# Patient Record
Sex: FEMALE | Race: WHITE | Employment: OTHER | URBAN - NONMETROPOLITAN AREA
[De-identification: names, ages, dates, MRNs, and addresses within clinical notes are randomized per-mention and may not be internally consistent; named-entity substitution may affect disease eponyms.]

---

## 2017-02-13 ENCOUNTER — OFFICE VISIT (OUTPATIENT)
Dept: CARDIOLOGY | Age: 77
End: 2017-02-13
Payer: MEDICARE

## 2017-02-13 VITALS
BODY MASS INDEX: 30.39 KG/M2 | HEIGHT: 64 IN | DIASTOLIC BLOOD PRESSURE: 66 MMHG | SYSTOLIC BLOOD PRESSURE: 132 MMHG | HEART RATE: 60 BPM | WEIGHT: 178 LBS

## 2017-02-13 DIAGNOSIS — E78.2 MIXED HYPERLIPIDEMIA: ICD-10-CM

## 2017-02-13 DIAGNOSIS — I48.0 PAROXYSMAL ATRIAL FIBRILLATION (HCC): Primary | ICD-10-CM

## 2017-02-13 DIAGNOSIS — I10 ESSENTIAL HYPERTENSION: ICD-10-CM

## 2017-02-13 PROCEDURE — 99213 OFFICE O/P EST LOW 20 MIN: CPT | Performed by: NURSE PRACTITIONER

## 2017-02-13 PROCEDURE — G8400 PT W/DXA NO RESULTS DOC: HCPCS | Performed by: NURSE PRACTITIONER

## 2017-02-13 PROCEDURE — 1090F PRES/ABSN URINE INCON ASSESS: CPT | Performed by: NURSE PRACTITIONER

## 2017-02-13 PROCEDURE — 1123F ACP DISCUSS/DSCN MKR DOCD: CPT | Performed by: NURSE PRACTITIONER

## 2017-02-13 PROCEDURE — 4040F PNEUMOC VAC/ADMIN/RCVD: CPT | Performed by: NURSE PRACTITIONER

## 2017-02-13 PROCEDURE — G8484 FLU IMMUNIZE NO ADMIN: HCPCS | Performed by: NURSE PRACTITIONER

## 2017-02-13 PROCEDURE — G8417 CALC BMI ABV UP PARAM F/U: HCPCS | Performed by: NURSE PRACTITIONER

## 2017-02-13 PROCEDURE — 1036F TOBACCO NON-USER: CPT | Performed by: NURSE PRACTITIONER

## 2017-02-13 PROCEDURE — G8427 DOCREV CUR MEDS BY ELIG CLIN: HCPCS | Performed by: NURSE PRACTITIONER

## 2017-02-13 PROCEDURE — 93000 ELECTROCARDIOGRAM COMPLETE: CPT | Performed by: NURSE PRACTITIONER

## 2017-03-20 DIAGNOSIS — I48.91 ATRIAL FIBRILLATION, UNSPECIFIED TYPE (HCC): ICD-10-CM

## 2017-03-20 RX ORDER — PROPAFENONE HYDROCHLORIDE 225 MG/1
225 TABLET, FILM COATED ORAL EVERY 8 HOURS
Qty: 270 TABLET | Refills: 3 | Status: SHIPPED | OUTPATIENT
Start: 2017-03-20 | End: 2018-03-13 | Stop reason: SDUPTHER

## 2017-09-12 ENCOUNTER — OFFICE VISIT (OUTPATIENT)
Dept: CARDIOLOGY | Age: 77
End: 2017-09-12
Payer: MEDICARE

## 2017-09-12 VITALS
DIASTOLIC BLOOD PRESSURE: 70 MMHG | WEIGHT: 178.8 LBS | SYSTOLIC BLOOD PRESSURE: 118 MMHG | BODY MASS INDEX: 30.52 KG/M2 | HEIGHT: 64 IN | HEART RATE: 74 BPM

## 2017-09-12 DIAGNOSIS — I10 ESSENTIAL HYPERTENSION: ICD-10-CM

## 2017-09-12 DIAGNOSIS — Z79.01 CHRONIC ANTICOAGULATION: ICD-10-CM

## 2017-09-12 DIAGNOSIS — I48.0 PAF (PAROXYSMAL ATRIAL FIBRILLATION) (HCC): Primary | ICD-10-CM

## 2017-09-12 DIAGNOSIS — R53.1 WEAKNESS: ICD-10-CM

## 2017-09-12 PROCEDURE — 99213 OFFICE O/P EST LOW 20 MIN: CPT | Performed by: NURSE PRACTITIONER

## 2017-09-12 PROCEDURE — 1123F ACP DISCUSS/DSCN MKR DOCD: CPT | Performed by: NURSE PRACTITIONER

## 2017-09-12 PROCEDURE — G8417 CALC BMI ABV UP PARAM F/U: HCPCS | Performed by: NURSE PRACTITIONER

## 2017-09-12 PROCEDURE — 4040F PNEUMOC VAC/ADMIN/RCVD: CPT | Performed by: NURSE PRACTITIONER

## 2017-09-12 PROCEDURE — 1036F TOBACCO NON-USER: CPT | Performed by: NURSE PRACTITIONER

## 2017-09-12 PROCEDURE — 1090F PRES/ABSN URINE INCON ASSESS: CPT | Performed by: NURSE PRACTITIONER

## 2017-09-12 PROCEDURE — G8427 DOCREV CUR MEDS BY ELIG CLIN: HCPCS | Performed by: NURSE PRACTITIONER

## 2017-09-12 PROCEDURE — G8400 PT W/DXA NO RESULTS DOC: HCPCS | Performed by: NURSE PRACTITIONER

## 2018-03-13 ENCOUNTER — OFFICE VISIT (OUTPATIENT)
Dept: CARDIOLOGY | Age: 78
End: 2018-03-13
Payer: MEDICARE

## 2018-03-13 VITALS
BODY MASS INDEX: 30.22 KG/M2 | HEIGHT: 64 IN | SYSTOLIC BLOOD PRESSURE: 138 MMHG | WEIGHT: 177 LBS | HEART RATE: 70 BPM | DIASTOLIC BLOOD PRESSURE: 80 MMHG

## 2018-03-13 DIAGNOSIS — Z79.01 CHRONIC ANTICOAGULATION: ICD-10-CM

## 2018-03-13 DIAGNOSIS — I10 ESSENTIAL HYPERTENSION: ICD-10-CM

## 2018-03-13 DIAGNOSIS — R31.9 HEMATURIA, UNSPECIFIED TYPE: ICD-10-CM

## 2018-03-13 DIAGNOSIS — E78.2 MIXED HYPERLIPIDEMIA: ICD-10-CM

## 2018-03-13 DIAGNOSIS — I48.0 PAROXYSMAL ATRIAL FIBRILLATION (HCC): Primary | ICD-10-CM

## 2018-03-13 LAB
BILIRUBIN URINE: NEGATIVE
BLOOD, URINE: NEGATIVE
CLARITY: ABNORMAL
COLOR: YELLOW
GLUCOSE URINE: 100 MG/DL
KETONES, URINE: ABNORMAL MG/DL
LEUKOCYTE ESTERASE, URINE: NEGATIVE
NITRITE, URINE: NEGATIVE
PH UA: 6
PROTEIN UA: NEGATIVE MG/DL
SPECIFIC GRAVITY UA: >=1.03
UROBILINOGEN, URINE: 0.2 E.U./DL

## 2018-03-13 PROCEDURE — G8400 PT W/DXA NO RESULTS DOC: HCPCS | Performed by: NURSE PRACTITIONER

## 2018-03-13 PROCEDURE — 1036F TOBACCO NON-USER: CPT | Performed by: NURSE PRACTITIONER

## 2018-03-13 PROCEDURE — 1123F ACP DISCUSS/DSCN MKR DOCD: CPT | Performed by: NURSE PRACTITIONER

## 2018-03-13 PROCEDURE — G8484 FLU IMMUNIZE NO ADMIN: HCPCS | Performed by: NURSE PRACTITIONER

## 2018-03-13 PROCEDURE — 1090F PRES/ABSN URINE INCON ASSESS: CPT | Performed by: NURSE PRACTITIONER

## 2018-03-13 PROCEDURE — 99213 OFFICE O/P EST LOW 20 MIN: CPT | Performed by: NURSE PRACTITIONER

## 2018-03-13 PROCEDURE — G8417 CALC BMI ABV UP PARAM F/U: HCPCS | Performed by: NURSE PRACTITIONER

## 2018-03-13 PROCEDURE — 4040F PNEUMOC VAC/ADMIN/RCVD: CPT | Performed by: NURSE PRACTITIONER

## 2018-03-13 PROCEDURE — G8427 DOCREV CUR MEDS BY ELIG CLIN: HCPCS | Performed by: NURSE PRACTITIONER

## 2018-03-13 RX ORDER — MULTIVIT WITH MINERALS/LUTEIN
1000 TABLET ORAL DAILY
COMMUNITY
End: 2021-06-15

## 2018-03-13 RX ORDER — ESCITALOPRAM OXALATE 10 MG/1
TABLET ORAL
Refills: 3 | COMMUNITY
Start: 2017-12-11

## 2018-03-13 RX ORDER — PROPAFENONE HYDROCHLORIDE 225 MG/1
225 TABLET, FILM COATED ORAL EVERY 8 HOURS
Qty: 270 TABLET | Refills: 3 | Status: SHIPPED | OUTPATIENT
Start: 2018-03-13 | End: 2019-03-13 | Stop reason: SDUPTHER

## 2018-03-13 NOTE — PROGRESS NOTES
shortness of air. Cardiovascular  no exertional chest pain to suggest myocardial ischemia. No orthopnea or PND. No sensation of sustained arrythmia. No occurrence of slow heart rate. No palpitations. No claudication. No leg edema. Gastrointestinal  no abdominal swelling or pain. No blood in stool. No severe constipation, diarrhea, nausea, or vomiting. Genitourinary  no dysuria, frequency, or urgency. No flank pain. + episode of hematuria. Musculoskeletal  no back pain or myalgia. No problems with gait. Extremities - no clubbing, cyanosis or edema. Skin  no color change or rash. No pallor. No new surgical incision. Neurologic  no speech difficulty, facial asymmetry or lateralizing weakness. No seizures, presyncope or syncope. No significant dizziness. Hematologic  no easy bruising or excessive bleeding. Psychiatric  no severe anxiety or insomnia. No confusion. All other review of systems are negative. Objective  Vital Signs - /80   Pulse 70   Ht 5' 4\" (1.626 m)   Wt 177 lb (80.3 kg)   BMI 30.38 kg/m²   General - [place] is alert, cooperative, and pleasant. Well groomed. No acute distress. Body habitus - Body mass index is 30.38 kg/m². HEENT  Head is normocephalic. No circumoral cyanosis. Dentition is normal.  EYES -   Lids normal without ptosis. No discharge, edema or subconjunctival hemorrhage. Neck - Symmetrical without apparent mass or lymphadenopathy. Respiratory - Normal respiratory effort without use of accessory muscles. Ausculatation reveals vesicular breath sounds without crackles, wheezes, rub or rhonchi. Cardiovascular  No jugular venous distention. Auscultation reveals regular rate and rhythm. No audible clicks, gallop or rub. 1/6 systolic murmur. No lower extremity varicosities. No carotid bruits. Abdominal -  No visible distention, mass or pulsations. Extremities - No clubbing or cyanosis. No statis dermatitis or ulcers. No edema.

## 2018-03-13 NOTE — PATIENT INSTRUCTIONS
Continue current medications as prescribed. Continue to follow up with primary care provider for non cardiac medical problems. Call the office with any problems, questions or concerns at [phone]. Follow up as scheduled with your cardiologist - 6 months Dr. Susan Medina. The following educational material has been included in this after visit summary for your review: atrial fibrillation and Eliquis.     Additional instructions:  Go to first floor outpatient registration for lab. Eliquis can increase your risk of bleeding. If you notice blood in urine or stool, bleeding gums, excessive bruising or cough productive of bloody sputum, notify the office. Information on this blood thinner has been included in your after visit summary. Coronary artery disease risk factors you can control: Smoking, high blood pressure, high cholesterol, diabetes, being overweight, lack of exercise and stress. Continue heart healthy diet. Take medications as directed. Exercise as tolerated. Strive for 15 minutes of exercise most days of the week. If asked to keep a blood pressure log, do so for 2 weeks. Call the office to report readings at [phone]. Blood pressure goal is 140/90 or less. If you are a diabetic, the goal is 130/80 or less. If you are taking cholesterol lowering medications, it is recommended that lab work be checked annually. Always keep a current medication list. Bring your medications to every office visit.          Patient Education        Learning About Atrial Fibrillation  What is atrial fibrillation? Atrial fibrillation (say \"AY-tree-kimberley iap-mnrh-MPB-shun\") is the most common type of irregular heartbeat (arrhythmia). Normally, the heart beats in a strong, steady rhythm. In atrial fibrillation, a problem with the heart's electrical system causes the two upper parts of the heart (the atria) to quiver, or fibrillate.  Your heart rate also may be faster than normal.  Atrial fibrillation can be artificial heart valve, or if you have any active bleeding from a surgery, injury, or other cause. Apixaban can cause a very serious blood clot around your spinal cord if you undergo a spinal tap or receive spinal anesthesia (epidural), especially if you have a genetic spinal defect, if you have a spinal catheter in place, if you have a history of spinal surgery or repeated spinal taps, or if you are also using other drugs that can affect blood clotting. This type of blood clot can lead to long-term or permanent paralysis. Get emergency medical help if you have symptoms of a spinal cord blood clot such as back pain, numbness or muscle weakness in your lower body, or loss of bladder or bowel control. Do not stop taking apixaban unless your doctor tells you to. Stopping suddenly can increase your risk of blood clot or stroke. What is apixaban? Apixaban blocks the activity of certain clotting substances in the blood. Apixaban is used to lower the risk of stroke caused by a blood clot in people with a heart rhythm disorder called atrial fibrillation. Apixaban is also used after hip or knee replacement surgery to prevent a type of blood clot called deep vein thrombosis (DVT), which can lead to blood clots in the lungs (pulmonary embolism). Apixaban is also used to treat DVT or pulmonary embolism (PE), and to lower your risk of having a repeat DVT or PE. Apixaban may also be used for purposes not listed in this medication guide. What should I discuss with my healthcare provider before taking apixaban? You should not take apixaban if you are allergic to it, if you have an artificial heart valve, or if you have any active bleeding from a surgery, injury, or other cause. Apixaban may cause you to bleed more easily, especially if you have a bleeding disorder that is inherited or caused by disease.   To make sure apixaban is safe for you, tell your doctor if you have:  · kidney disease (or if you are on dialysis);  · liver disease;  · if you are older than 80; or  · if you weigh less than 132 pounds. Apixaban can cause a very serious blood clot around your spinal cord if you undergo a spinal tap or receive spinal anesthesia (epidural). This type of blood clot could cause long-term paralysis, and may be more likely to occur if:   · you have a spinal catheter in place or if a catheter has been recently removed;  · you have a history of spinal surgery or repeated spinal taps;  · you have recently had a spinal tap or epidural anesthesia;  · you are taking an NSAID (nonsteroidal anti-inflammatory drug)--ibuprofen (Advil, Motrin), naproxen (Aleve), diclofenac, indomethacin, meloxicam, and others; or  · you are using other medicines to treat or prevent blood clots. This medicine is not expected to harm an unborn baby. However, taking this medicine during pregnancy may increase the risk of bleeding while you are pregnant or during your delivery. Tell your doctor if you are pregnant or plan to become pregnant. It is not known whether apixaban passes into breast milk or if it could harm a nursing baby. You should not breast-feed while using this medicine. How should I take apixaban? Apixaban is usually taken twice per day. Follow all directions on your prescription label. Do not take this medicine in larger or smaller amounts or for longer than recommended. You may take apixaban with or without food. To make swallowing easier, you may crush an apixaban tablet and mix the medicine with water or apple juice, or with a spoonful of applesauce. Swallow the mixture right away without chewing. Do not save it for later use. The mixture will no longer be good after 4 hours. Apixaban can be given through a nasogastric (NG) feeding tube. Crush the tablet and mix the medicine in a syringe with 60 milliliters of 5% dextrose in water (D5W). Give this mixture right away through the NG tube. Do not save for later use.  Do not give paroxetine, sertraline (Zoloft), trazodone, venlafaxine, vilazodone;  · an NSAID such as ibuprofen (Advil, Motrin), naproxen (Aleve), celecoxib (Celebrex), diclofenac, indomethacin, meloxicam, and others; or  · salicylates such as aspirin, Nuprin Backache Caplet, Kaopectate, KneeRelief, Pamprin Cramp Formula, Pepto-Bismol, Tricosal, Trilisate, and others. These lists are not complete and many other medicines can interact with apixaban. This includes prescription and over-the-counter medicines, vitamins, and herbal products. Give a list of all your medicines to any healthcare provider who treats you. Where can I get more information? Your pharmacist can provide more information about apixaban. Remember, keep this and all other medicines out of the reach of children, never share your medicines with others, and use this medication only for the indication prescribed. Every effort has been made to ensure that the information provided by [address] is accurate, up-to-date, and complete, but no guarantee is made to that effect. Drug information contained herein may be time sensitive. [facility] information has been compiled for use by healthcare practitioners and consumers in the [place] and therefore [facility] does not warrant that uses outside of the [place] are appropriate, unless specifically indicated otherwise. [facility]'s drug information does not endorse drugs, diagnose patients or recommend therapy. [facility] drug information is an informational resource designed to assist licensed healthcare practitioners in caring for their patients and/or to serve consumers viewing this service as a supplement to, and not a substitute for, the expertise, skill, knowledge and judgment of healthcare practitioners.  The absence of a warning for a given drug or drug combination in no way should be construed to indicate that the drug or drug combination is safe, effective or appropriate for any given

## 2018-06-15 RX ORDER — METOPROLOL SUCCINATE 25 MG/1
TABLET, EXTENDED RELEASE ORAL
Qty: 90 TABLET | Refills: 3 | Status: SHIPPED | OUTPATIENT
Start: 2018-06-15 | End: 2019-03-13 | Stop reason: SDUPTHER

## 2018-09-12 ENCOUNTER — OFFICE VISIT (OUTPATIENT)
Dept: CARDIOLOGY | Age: 78
End: 2018-09-12
Payer: MEDICARE

## 2018-09-12 VITALS
DIASTOLIC BLOOD PRESSURE: 68 MMHG | HEIGHT: 64 IN | HEART RATE: 64 BPM | BODY MASS INDEX: 30.05 KG/M2 | SYSTOLIC BLOOD PRESSURE: 122 MMHG | WEIGHT: 176 LBS

## 2018-09-12 DIAGNOSIS — E78.2 MIXED HYPERLIPIDEMIA: ICD-10-CM

## 2018-09-12 DIAGNOSIS — I48.0 PAROXYSMAL ATRIAL FIBRILLATION (HCC): Primary | ICD-10-CM

## 2018-09-12 DIAGNOSIS — I10 ESSENTIAL HYPERTENSION: ICD-10-CM

## 2018-09-12 PROCEDURE — 99213 OFFICE O/P EST LOW 20 MIN: CPT | Performed by: INTERNAL MEDICINE

## 2018-09-12 PROCEDURE — G8417 CALC BMI ABV UP PARAM F/U: HCPCS | Performed by: INTERNAL MEDICINE

## 2018-09-12 PROCEDURE — 1090F PRES/ABSN URINE INCON ASSESS: CPT | Performed by: INTERNAL MEDICINE

## 2018-09-12 PROCEDURE — G8427 DOCREV CUR MEDS BY ELIG CLIN: HCPCS | Performed by: INTERNAL MEDICINE

## 2018-09-12 PROCEDURE — 1101F PT FALLS ASSESS-DOCD LE1/YR: CPT | Performed by: INTERNAL MEDICINE

## 2018-09-12 RX ORDER — LISINOPRIL 5 MG/1
5 TABLET ORAL DAILY
COMMUNITY
End: 2018-09-12 | Stop reason: DRUGHIGH

## 2018-09-12 RX ORDER — LISINOPRIL 10 MG/1
10 TABLET ORAL DAILY
COMMUNITY
Start: 2018-09-11

## 2018-09-20 NOTE — PROGRESS NOTES
[address], [address]    Amparo Huitron : 1940, 65 y.o. Female        Office Visit:  2018    Chief Complaint   Patient presents with    6 Month Follow-Up     States she is doing well.  Atrial Fibrillation     Reason for visit:   1. Follow up of paroxysmal atrial fibrillation. 2.  Follow up of hypertension. 3.  Follow up of hyperlipidemia. History of Present Illness:   Ms. Amparo Huitron no complaints. Exam is unremarkable. Patient Active Problem List   Diagnosis Code    Hypertension I10    Palpitation R00.2    Paroxysmal atrial fibrillation (HCC) I48.0    Mixed hyperlipidemia E78.2    Bradycardia R00.1    Fatigue R53.83    Weakness R53.1    Essential hypertension I10    Chronic anticoagulation Z79.01    Hematuria R31.9     Past Medical History:   Diagnosis Date    Hyperlipidemia     PCP Dr. Frantz Caraballo manages cholesterol    Hypertension     Palpitation     Paroxysmal atrial fibrillation (HCC)     hx of    Unspecified cerebral artery occlusion with cerebral infarction about 2 weeks ago    tia May 2011     Past Surgical History:   Procedure Laterality Date    BREAST BIOPSY      CHOLECYSTECTOMY      HYSTERECTOMY      KIDNEY STONE SURGERY        Her family history includes Cancer in her brother and sister; Coronary Art Dis in her brother, father, mother, and sister; Diabetes in her brother, mother, and sister; Hypertension in her brother, mother, and sister. Social History   Substance Use Topics    Smoking status: Former Smoker     Quit date: 1971    Smokeless tobacco: Never Used    Alcohol use No      Comment: occasional alcohol use      No Known Allergies  Outpatient Prescriptions Marked as Taking for the 18 encounter (Office Visit) with MARLA Michael MD   Medication Sig Dispense Refill    lisinopril (PRINIVIL;ZESTRIL) 10 MG tablet Take 10 mg by mouth daily       apixaban (ELIQUIS) 5 MG TABS tablet Take 1 tablet by mouth 2 times daily 180 tablet 3    metoprolol succinate (TOPROL XL) 25 MG extended release tablet TAKE 1/2 TABLET BY MOUTH EVERY DAY / THIS IS A DOSE DECREASE 90 tablet 3    escitalopram (LEXAPRO) 10 MG tablet TAKE 1 TABLET BY MOUTH EVERY DAY  3    Ascorbic Acid (VITAMIN C) 1000 MG tablet Take 1,000 mg by mouth daily      propafenone (RYTHMOL) 225 MG tablet Take 1 tablet by mouth every 8 hours 270 tablet 3    omeprazole (PRILOSEC) 20 MG capsule Take 20 mg by mouth daily.  lorazepam (ATIVAN) 1 MG tablet Take 1 mg by mouth daily.  vitamin D (CHOLECALCIFEROL) 400 UNIT TABS tablet Take 400 Units by mouth daily.  aspirin EC 81 MG EC tablet Take 81 mg by mouth daily.  Vitamins-Lipotropics (VIT BALANCED B-100 PO) Take 1 tablet by mouth daily. I have reviewed and confirm the Past Medical History, Allergies, and Medications sections above and have updated the computerized patient record. Data:   BP Readings from Last 3 Encounters:   09/12/18 122/68   03/13/18 138/80   09/12/17 118/70    Pulse Readings from Last 3 Encounters:   09/12/18 64   03/13/18 70   09/12/17 74      No results found for this or any previous visit (from the past 336 hour(s)). Review of Systems - As per HPI, otherwise negative. Ten organ review performed. Physical Exam:  Vital Signs:  /68   Pulse 64   Ht 5' 4\" (1.626 m)   Wt 176 lb (79.8 kg)   BMI 30.21 kg/m²   Constitutional:  The patient is a pleasant 65 y.o. female in no acute distress. She appears well-developed and well-nourished. HEAD:  Normocephalic without evidence of old or recent trauma. EYES:  Sclerae clear. Conjunctivae pink. EOMs intact. Pupils equal and round. NOSE:  No nasal discharge or epistaxis. MOUTH:  Teeth, gums and palate normal.   THROAT:  No lesions on lips or buccal mucosa. Tongue protrudes in midline and is well papillated.   NECK:  There are no carotid

## 2019-03-13 ENCOUNTER — OFFICE VISIT (OUTPATIENT)
Dept: CARDIOLOGY | Age: 79
End: 2019-03-13
Payer: MEDICARE

## 2019-03-13 VITALS
DIASTOLIC BLOOD PRESSURE: 68 MMHG | BODY MASS INDEX: 29.88 KG/M2 | SYSTOLIC BLOOD PRESSURE: 122 MMHG | WEIGHT: 175 LBS | HEIGHT: 64 IN | HEART RATE: 59 BPM

## 2019-03-13 DIAGNOSIS — E78.2 MIXED HYPERLIPIDEMIA: ICD-10-CM

## 2019-03-13 DIAGNOSIS — Z79.01 CHRONIC ANTICOAGULATION: ICD-10-CM

## 2019-03-13 DIAGNOSIS — I48.0 PAROXYSMAL ATRIAL FIBRILLATION (HCC): ICD-10-CM

## 2019-03-13 DIAGNOSIS — I10 ESSENTIAL HYPERTENSION: Primary | ICD-10-CM

## 2019-03-13 PROCEDURE — G8400 PT W/DXA NO RESULTS DOC: HCPCS | Performed by: NURSE PRACTITIONER

## 2019-03-13 PROCEDURE — 4040F PNEUMOC VAC/ADMIN/RCVD: CPT | Performed by: NURSE PRACTITIONER

## 2019-03-13 PROCEDURE — 1036F TOBACCO NON-USER: CPT | Performed by: NURSE PRACTITIONER

## 2019-03-13 PROCEDURE — G8417 CALC BMI ABV UP PARAM F/U: HCPCS | Performed by: NURSE PRACTITIONER

## 2019-03-13 PROCEDURE — 1101F PT FALLS ASSESS-DOCD LE1/YR: CPT | Performed by: NURSE PRACTITIONER

## 2019-03-13 PROCEDURE — G8427 DOCREV CUR MEDS BY ELIG CLIN: HCPCS | Performed by: NURSE PRACTITIONER

## 2019-03-13 PROCEDURE — 1090F PRES/ABSN URINE INCON ASSESS: CPT | Performed by: NURSE PRACTITIONER

## 2019-03-13 PROCEDURE — 93000 ELECTROCARDIOGRAM COMPLETE: CPT | Performed by: NURSE PRACTITIONER

## 2019-03-13 PROCEDURE — 1123F ACP DISCUSS/DSCN MKR DOCD: CPT | Performed by: NURSE PRACTITIONER

## 2019-03-13 PROCEDURE — 99213 OFFICE O/P EST LOW 20 MIN: CPT | Performed by: NURSE PRACTITIONER

## 2019-03-13 PROCEDURE — G8484 FLU IMMUNIZE NO ADMIN: HCPCS | Performed by: NURSE PRACTITIONER

## 2019-03-13 RX ORDER — PROPAFENONE HYDROCHLORIDE 225 MG/1
225 TABLET, FILM COATED ORAL EVERY 8 HOURS
Qty: 270 TABLET | Refills: 3 | Status: SHIPPED | OUTPATIENT
Start: 2019-03-13 | End: 2020-03-12

## 2019-03-13 RX ORDER — LISINOPRIL 10 MG/1
10 TABLET ORAL DAILY
Qty: 90 TABLET | Refills: 3 | Status: CANCELLED | OUTPATIENT
Start: 2019-03-13

## 2019-03-13 RX ORDER — METOPROLOL SUCCINATE 25 MG/1
TABLET, EXTENDED RELEASE ORAL
Qty: 45 TABLET | Refills: 3 | Status: SHIPPED | OUTPATIENT
Start: 2019-03-13 | End: 2019-11-18 | Stop reason: SDUPTHER

## 2019-07-10 ENCOUNTER — TELEPHONE (OUTPATIENT)
Dept: CARDIOLOGY | Age: 79
End: 2019-07-10

## 2019-07-10 NOTE — TELEPHONE ENCOUNTER
Procedure Date: TBD    Cardiologist: Jermaine    Procedure: colonoscopy    Surgeon: Svetlana Min    Last Office Visit: 3/13/19  Reason for office visit and medical concerns addressed at this office visit: HTN, PAF, Hyperlipidemia, Palpations, Bradycardia    Testing Performed and Date of Service:  EKG 3/13/19  Estelita 2/18/16  Data reviewed:  Impression   Impression:   1. Negative Lexiscan for the presence of ischemia or infarction. 2. The left ventricular ejection fraction is 77 percent.       Dictated on 2/18/2016 11:05 PM EST. Signed by Dr Cricket Richey on   2/18/2016 11:06 PM EST   Signed by Dr Pooja Hollingsworth 02/18/2016 22:06        Does the patient have a stent? na    Current Medications: Eliquis, ASA, Toprol xl, Rythmol, Lisinopril, Lexapro, Prilosec, Ativan    Is the patient currently taking an anticoagulant? Eliquis, ASA    Additional Notes: Request to hold Eliquis 3 days prior to procedure.

## 2019-11-18 RX ORDER — METOPROLOL SUCCINATE 25 MG/1
TABLET, EXTENDED RELEASE ORAL
Qty: 45 TABLET | Refills: 3 | Status: SHIPPED | OUTPATIENT
Start: 2019-11-18 | End: 2020-10-13

## 2019-11-25 ENCOUNTER — OFFICE VISIT (OUTPATIENT)
Dept: CARDIOLOGY | Age: 79
End: 2019-11-25
Payer: MEDICARE

## 2019-11-25 VITALS
BODY MASS INDEX: 29.88 KG/M2 | HEIGHT: 64 IN | HEART RATE: 62 BPM | WEIGHT: 175 LBS | DIASTOLIC BLOOD PRESSURE: 64 MMHG | SYSTOLIC BLOOD PRESSURE: 122 MMHG

## 2019-11-25 DIAGNOSIS — I48.0 PAROXYSMAL ATRIAL FIBRILLATION (HCC): Primary | ICD-10-CM

## 2019-11-25 DIAGNOSIS — E78.2 MIXED HYPERLIPIDEMIA: ICD-10-CM

## 2019-11-25 DIAGNOSIS — Z79.01 CHRONIC ANTICOAGULATION: ICD-10-CM

## 2019-11-25 DIAGNOSIS — I10 ESSENTIAL HYPERTENSION: ICD-10-CM

## 2019-11-25 PROCEDURE — G8417 CALC BMI ABV UP PARAM F/U: HCPCS | Performed by: NURSE PRACTITIONER

## 2019-11-25 PROCEDURE — G8400 PT W/DXA NO RESULTS DOC: HCPCS | Performed by: NURSE PRACTITIONER

## 2019-11-25 PROCEDURE — 93000 ELECTROCARDIOGRAM COMPLETE: CPT | Performed by: NURSE PRACTITIONER

## 2019-11-25 PROCEDURE — 1090F PRES/ABSN URINE INCON ASSESS: CPT | Performed by: NURSE PRACTITIONER

## 2019-11-25 PROCEDURE — 1036F TOBACCO NON-USER: CPT | Performed by: NURSE PRACTITIONER

## 2019-11-25 PROCEDURE — G8484 FLU IMMUNIZE NO ADMIN: HCPCS | Performed by: NURSE PRACTITIONER

## 2019-11-25 PROCEDURE — 1123F ACP DISCUSS/DSCN MKR DOCD: CPT | Performed by: NURSE PRACTITIONER

## 2019-11-25 PROCEDURE — G8427 DOCREV CUR MEDS BY ELIG CLIN: HCPCS | Performed by: NURSE PRACTITIONER

## 2019-11-25 PROCEDURE — 99213 OFFICE O/P EST LOW 20 MIN: CPT | Performed by: NURSE PRACTITIONER

## 2019-11-25 PROCEDURE — 4040F PNEUMOC VAC/ADMIN/RCVD: CPT | Performed by: NURSE PRACTITIONER

## 2020-03-12 RX ORDER — PROPAFENONE HYDROCHLORIDE 225 MG/1
225 TABLET, FILM COATED ORAL EVERY 8 HOURS
Qty: 270 TABLET | Refills: 0 | Status: SHIPPED | OUTPATIENT
Start: 2020-03-12 | End: 2020-06-04

## 2020-06-04 RX ORDER — PROPAFENONE HYDROCHLORIDE 225 MG/1
225 TABLET, FILM COATED ORAL EVERY 8 HOURS
Qty: 270 TABLET | Refills: 2 | Status: SHIPPED | OUTPATIENT
Start: 2020-06-04 | End: 2021-03-05

## 2020-06-10 ENCOUNTER — VIRTUAL VISIT (OUTPATIENT)
Dept: CARDIOLOGY | Age: 80
End: 2020-06-10
Payer: MEDICARE

## 2020-06-10 PROCEDURE — 1036F TOBACCO NON-USER: CPT | Performed by: NURSE PRACTITIONER

## 2020-06-10 PROCEDURE — 99441 PR PHYS/QHP TELEPHONE EVALUATION 5-10 MIN: CPT | Performed by: NURSE PRACTITIONER

## 2020-06-10 PROCEDURE — 4040F PNEUMOC VAC/ADMIN/RCVD: CPT | Performed by: NURSE PRACTITIONER

## 2020-06-10 PROCEDURE — 1123F ACP DISCUSS/DSCN MKR DOCD: CPT | Performed by: NURSE PRACTITIONER

## 2020-06-10 PROCEDURE — G8427 DOCREV CUR MEDS BY ELIG CLIN: HCPCS | Performed by: NURSE PRACTITIONER

## 2020-06-10 PROCEDURE — 1090F PRES/ABSN URINE INCON ASSESS: CPT | Performed by: NURSE PRACTITIONER

## 2020-06-10 PROCEDURE — G8400 PT W/DXA NO RESULTS DOC: HCPCS | Performed by: NURSE PRACTITIONER

## 2020-06-10 PROCEDURE — G8417 CALC BMI ABV UP PARAM F/U: HCPCS | Performed by: NURSE PRACTITIONER

## 2020-06-10 RX ORDER — VITAMIN B COMPLEX
1 CAPSULE ORAL DAILY
COMMUNITY

## 2020-06-10 NOTE — PATIENT INSTRUCTIONS
New instructions for today:  Eliquis can increase your risk of bleeding. If you notice blood in urine or stool, bleeding gums, excessive bruising or cough productive of bloody sputum, notify the office. Information on this blood thinner has been included in your after visit summary. Patient Instructions:  Continue current medications as prescribed. Always keep a current medication list. Bring your medications to every office visit. Continue to follow up with primary care provider for non cardiac medical problems. Call the office with any problems, questions or concerns at [phone]. If you have been asked to keep a blood pressure log, do so for 2 weeks. Call the office to report readings to the triage nurse at [phone]. Follow up with cardiologist as scheduled. The following educational material has been included in this after visit summary for your review: Life simple 7. Heart health. Eliquis. Life simple 7  1) Manage blood pressure - high blood pressure is a major risk factor for heart disease and stroke. Keeping blood pressure in health range reduces strain on your heart, arteries and kidneys. Blood pressure goal is less than 130/80. 2) Control cholesterol - contributes to plaque, which can clog arteries and lead to heart disease and stroke. When you control your cholesterol you are giving your arteries their best chance to remain clear. It is recommended that you get cholesterol lab work done once a year. 3) Reduce blood sugar - most of the food we eat is turning into glucose or blood sugar that our body uses for energy. Over time, high levels of blood sugar can damage your heart, kidneys, eyes and nerves. 4) Get active - living an active life is one of the most rewarding gifts you can give yourself and those you love. Simply put, daily physical activity increases your length and quality of life. Strive to exercise 15 minutes most days of the week.   5)  Eat better - A healthy diet is one of your best weapons for fighting cardiovascular disease. When you eat a heart healthy diet, you improve your chances for feeling good and staying healthy for life. 6)  Lose weight - when you shed extra fat an unnecessary pounds, you reduce the burden on your hear, lungs, blood vessels and skeleton. You give yourself the gift of active living, you lower your blood pressure and help yourself feel better. 7) Stop smoking - cigarette smokers have a higher risk of developing cardiovascular disease. If  You smoke, quitting is the best thing you can do for your health. Check American Heart Association on line for more information on Life's Simple 7 and tips for healthy living. Patient Education        A Healthy Heart: Care Instructions  Your Care Instructions     Coronary artery disease, also called heart disease, occurs when a substance called plaque builds up in the vessels that supply oxygen-rich blood to your heart muscle. This can narrow the blood vessels and reduce blood flow. A heart attack happens when blood flow is completely blocked. A high-fat diet, smoking, and other factors increase the risk of heart disease. Your doctor has found that you have a chance of having heart disease. You can do lots of things to keep your heart healthy. It may not be easy, but you can change your diet, exercise more, and quit smoking. These steps really work to lower your chance of heart disease. Follow-up care is a key part of your treatment and safety. Be sure to make and go to all appointments, and call your doctor if you are having problems. It's also a good idea to know your test results and keep a list of the medicines you take. How can you care for yourself at home? Diet  · Use less salt when you cook and eat. This helps lower your blood pressure. Taste food before salting. Add only a little salt when you think you need it. With time, your taste buds will adjust to less salt.   · Eat fewer snack items, problem with your medicine. · If your doctor recommends aspirin, take the amount directed each day. Make sure you take aspirin and not another kind of pain reliever, such as acetaminophen (Tylenol). When should you call for help? [id number] if you have symptoms of a heart attack. These may include:  · Chest pain or pressure, or a strange feeling in the chest.  · Sweating. · Shortness of breath. · Pain, pressure, or a strange feeling in the back, neck, jaw, or upper belly or in one or both shoulders or arms. · Lightheadedness or sudden weakness. · A fast or irregular heartbeat. After you call 911, the  may tell you to chew 1 adult-strength or 2 to 4 low-dose aspirin. Wait for an ambulance. Do not try to drive yourself. Watch closely for changes in your health, and be sure to contact your doctor if you have any problems. Where can you learn more? Go to https://[employer]. org and sign in to your [employer] account. Enter Y073 in the Coin box to learn more about \"A Healthy Heart: Care Instructions. \"     If you do not have an account, please click on the \"Sign Up Now\" link. Current as of: December 16, 2019               Content Version: 12.5  © 7362-8100 [employer]. Care instructions adapted under license by [employer]. If you have questions about a medical condition or this instruction, always ask your healthcare professional. [place] [address] any warranty or liability for your use of this information. Patient Education        apixaban  Pronunciation:  a PIX a ban  Brand:  Eliquis  What is the most important information I should know about apixaban? Apixaban increases your risk of severe or fatal bleeding, especially if you take certain medicines at the same time (including some over-the-counter medicines). It is very important to tell your doctor about all medicines you have recently used.   Call your doctor at once if you dentist ahead of time if you have taken apixaban within the past 24 hours. You may need to stop taking apixaban for a short time. Do not stop taking apixaban unless your doctor tells you to. Stopping suddenly can increase your risk of blood clot or stroke. If you stop taking apixaban for any reason, your doctor may prescribe another medication to prevent blood clots until you start taking apixaban again. Store at room temperature away from moisture and heat. What happens if I miss a dose? Take the missed dose on the same day you remember it. Take your next dose at the regular time and stay on your twice-daily schedule. Do not take two doses at one time. Get your prescription refilled before you run out of medicine completely. What happens if I overdose? Seek emergency medical attention or call the Poison Help line at 1-136.921.3108. What should I avoid while taking apixaban? Avoid activities that may increase your risk of bleeding or injury. Use extra care to prevent bleeding while shaving or brushing your teeth. What are the possible side effects of apixaban? Get emergency medical help if you have signs of an allergic reaction: hives; chest pain, wheezing, difficult breathing; feeling light-headed; swelling of your face, lips, tongue, or throat. Also seek emergency medical attention if you have symptoms of a spinal blood clot: back pain, numbness or muscle weakness in your lower body, or loss of bladder or bowel control. Call your doctor at once if you have:  · easy bruising, unusual bleeding (nose, mouth, vagina, or rectum), bleeding from wounds or needle injections, any bleeding that will not stop;  · heavy menstrual periods;  · headache, dizziness, weakness, feeling like you might pass out;  · urine that looks red, pink, or brown; or  · black or bloody stools, coughing up blood or vomit that looks like coffee grounds. This is not a complete list of side effects and others may occur.  Call your

## 2020-06-10 NOTE — PROGRESS NOTES
weakness to suggest cerebrovascular accident or transient ischemic attack. + fatigue - not a new symptom. Review of Systems    Prior to Visit Medications    Medication Sig Taking? Authorizing Provider   b complex vitamins capsule Take 1 capsule by mouth daily Yes Historical Provider, MD   propafenone (RYTHMOL) 225 MG tablet TAKE 1 TABLET BY MOUTH EVERY 8 HOURS Yes ANGELA Hunter - CNP   metoprolol succinate (TOPROL XL) 25 MG extended release tablet TAKE 1/2 TABLET BY MOUTH EVERY DAY / THIS IS A DOSE DECREASE Yes ANGELA Angel   apixaban (ELIQUIS) 5 MG TABS tablet Take 1 tablet by mouth 2 times daily Yes ANGELA Wnag   lisinopril (PRINIVIL;ZESTRIL) 10 MG tablet Take 10 mg by mouth daily  Yes Historical Provider, MD   escitalopram (LEXAPRO) 10 MG tablet TAKE 1 TABLET BY MOUTH EVERY DAY Yes Historical Provider, MD   Ascorbic Acid (VITAMIN C) 1000 MG tablet Take 1,000 mg by mouth daily Yes Historical Provider, MD   omeprazole (PRILOSEC) 20 MG capsule Take 20 mg by mouth as needed  Yes Historical Provider, MD   lorazepam (ATIVAN) 1 MG tablet Take 1 mg by mouth daily. Yes Historical Provider, MD   vitamin D (CHOLECALCIFEROL) 400 UNIT TABS tablet Take 400 Units by mouth daily. Yes Historical Provider, MD   aspirin EC 81 MG EC tablet Take 81 mg by mouth as needed  Yes Historical Provider, MD       Social History     Tobacco Use    Smoking status: Former Smoker     Last attempt to quit: 1971     Years since quittin.0    Smokeless tobacco: Never Used   Substance Use Topics    Alcohol use: No     Comment: occasional alcohol use    Drug use: No        REVIEW OF SYSTEMS:  Constitutional - no appetite change, or unexpected weight change. No fever, chills or diaphoresis. + fatigue. HEENT - no significant rhinorrhea or epistaxis. No tinnitus or significant hearing loss. Eyes - no sudden vision change or amaurosis.  No corneal arcus, xantholasma, subconjunctival hemorrhage or

## 2020-10-13 RX ORDER — METOPROLOL SUCCINATE 25 MG/1
TABLET, EXTENDED RELEASE ORAL
Qty: 45 TABLET | Refills: 3 | Status: SHIPPED | OUTPATIENT
Start: 2020-10-13 | End: 2021-10-11

## 2020-12-11 ENCOUNTER — TELEMEDICINE (OUTPATIENT)
Dept: CARDIOLOGY | Age: 80
End: 2020-12-11
Payer: MEDICARE

## 2020-12-11 PROCEDURE — G8427 DOCREV CUR MEDS BY ELIG CLIN: HCPCS | Performed by: INTERNAL MEDICINE

## 2020-12-11 PROCEDURE — G8400 PT W/DXA NO RESULTS DOC: HCPCS | Performed by: INTERNAL MEDICINE

## 2020-12-11 PROCEDURE — 99212 OFFICE O/P EST SF 10 MIN: CPT | Performed by: INTERNAL MEDICINE

## 2020-12-11 PROCEDURE — 4040F PNEUMOC VAC/ADMIN/RCVD: CPT | Performed by: INTERNAL MEDICINE

## 2020-12-11 PROCEDURE — 1123F ACP DISCUSS/DSCN MKR DOCD: CPT | Performed by: INTERNAL MEDICINE

## 2020-12-11 PROCEDURE — 1090F PRES/ABSN URINE INCON ASSESS: CPT | Performed by: INTERNAL MEDICINE

## 2020-12-11 NOTE — PROGRESS NOTES
12/11/2020    TELEHEALTH EVALUATION -- Audio/Visual (During JMCOB-73 public health emergency)    This interview was conducted with the patient at their home, me at [facility] on [facility] in [place], [place], and with the participation of my Medical Assistant, Verner Bunkers who facilitated the process via phone and accessing the medical record from the [facility] cardiology clinic. HPI:   43-year-old lady with a history of combined hyperlipidemia, hypertension, prior tobacco abuse and paroxysmal atrial fibrillation interviewed in medical follow-up. First discovered to have an episode of atrial fibrillation in 2010. At that time echocardiographic assessment revealed normal left ventricular function, mild left ventricular hypertrophy and no evidence of valvular disease. She was placed on Rythmol 225 mg t.i.d. on which she has remained with no symptomatic recurrence. She in addition is maintained on Eliquis 5 mg b.i.d. On questioning today she denies any significant dyspnea on exertion as well as chest discomfort. She and her  been particularly careful and no response to the pandemic. She's been compliant with her medical therapy. Review of Systems    Prior to Visit Medications    Medication Sig Taking?  Authorizing Provider   metoprolol succinate (TOPROL XL) 25 MG extended release tablet TAKE 1/2 TABLET BY MOUTH EVERY DAY (THIS IS A DOSE DECREASE) Yes ANGELA Lopez   apixaban (ELIQUIS) 5 MG TABS tablet Take 1 tablet by mouth 2 times daily Yes ANGELA Woo NP   b complex vitamins capsule Take 1 capsule by mouth daily Yes Historical Provider, MD   propafenone (RYTHMOL) 225 MG tablet TAKE 1 TABLET BY MOUTH EVERY 8 HOURS Yes ANGELA Hunter CNP   lisinopril (PRINIVIL;ZESTRIL) 10 MG tablet Take 10 mg by mouth daily  Yes Historical Provider, MD   escitalopram (LEXAPRO) 10 MG tablet TAKE 1 TABLET BY MOUTH EVERY DAY Yes Historical Provider, MD   Ascorbic Acid (VITAMIN C) 1000 MG tablet Take 1,000 mg by mouth daily Yes Historical Provider, MD   omeprazole (PRILOSEC) 20 MG capsule Take 20 mg by mouth as needed  Yes Historical Provider, MD   lorazepam (ATIVAN) 1 MG tablet Take 1 mg by mouth daily. Yes Historical Provider, MD   vitamin D (CHOLECALCIFEROL) 400 UNIT TABS tablet Take 400 Units by mouth daily. Yes Historical Provider, MD   aspirin EC 81 MG EC tablet Take 81 mg by mouth as needed  Yes Historical Provider, MD       Social History     Tobacco Use    Smoking status: Former Smoker     Last attempt to quit: 1971     Years since quittin.5    Smokeless tobacco: Never Used   Substance Use Topics    Alcohol use: No     Comment: occasional alcohol use    Drug use: No            PHYSICAL EXAMINATION:    Constitutional: [x] Appears well-developed and well-nourished [] No apparent distress      [] Abnormal-   Mental status  [x] Alert and awake  [] Oriented to person/place/time []Able to follow commands      Eyes:  EOM    [x]  Normal  [] Abnormal-  Sclera  [x]  Normal  [] Abnormal -         Discharge [x]  None visible  [] Abnormal -    HENT:   [x] Normocephalic, atraumatic.   [] Abnormal   [x] Mouth/Throat: Mucous membranes are moist.     External Ears [x] Normal  [] Abnormal-     Neck: [] No visualized mass     Pulmonary/Chest: [x] Respiratory effort normal.  [] No visualized signs of difficulty breathing or respiratory distress        [] Abnormal-      Musculoskeletal:   [] Normal gait with no signs of ataxia         [x] Normal range of motion of neck        [] Abnormal-       Neurological:        [x] No Facial Asymmetry (Cranial nerve 7 motor function) (limited exam to video visit)          [x] No gaze palsy        [] Abnormal-         Skin:        [x] No significant exanthematous lesions or discoloration noted on facial skin         [] Abnormal-            Psychiatric:       [x] Normal Affect [] No Hallucinations        [] Abnormal-     Other pertinent observable physical exam findings-     ASSESSMENT/PLAN:    1. Paroxysmal atrial fibrillation - no symptomatic recurrence for his prior episodes were apparent. Continue Eliquis and Rythmol. 2.  Hypertension - reportedly well controlled  3. Dyslipidemia - monitored and managed by primary care  4. Pandemic response - appropriate by history          Christophe Morton is a 79 y.o. female being evaluated by a Virtual Visit (video visit) encounter to address concerns as mentioned above. A caregiver was present when appropriate. Due to this being a TeleHealth encounter (During YUX-28 public health emergency), evaluation of the following organ systems was limited: Vitals/Constitutional/EENT/Resp/CV/GI//MS/Neuro/Skin/Heme-Lymph-Imm. Pursuant to the emergency declaration under the [address] [place] [address] authority and the [employer] and [employer] Act, this Virtual Visit was conducted with patient's (and/or legal guardian's) consent, to reduce the patient's risk of exposure to COVID-19 and provide necessary medical care. The patient (and/or legal guardian) has also been advised to contact this office for worsening conditions or problems, and seek emergency medical treatment and/or call 911 if deemed necessary. Patient identification was verified at the start of the visit: Yes    Total time spent on this encounter: 5-10 Minutes    Services were provided through a video synchronous discussion virtually to substitute for in-person clinic visit. Patient and provider were located at their individual homes. --Aruna Aldrich MD on 12/11/2020 at 3:06 PM    An electronic signature was used to authenticate this note.

## 2021-03-05 RX ORDER — PROPAFENONE HYDROCHLORIDE 225 MG/1
225 TABLET, FILM COATED ORAL EVERY 8 HOURS
Qty: 270 TABLET | Refills: 0 | Status: SHIPPED | OUTPATIENT
Start: 2021-03-05 | End: 2021-06-01

## 2021-06-01 RX ORDER — PROPAFENONE HYDROCHLORIDE 225 MG/1
225 TABLET, FILM COATED ORAL EVERY 8 HOURS
Qty: 270 TABLET | Refills: 1 | Status: SHIPPED | OUTPATIENT
Start: 2021-06-01 | End: 2021-11-22

## 2021-06-15 ENCOUNTER — OFFICE VISIT (OUTPATIENT)
Dept: CARDIOLOGY CLINIC | Age: 81
End: 2021-06-15
Payer: MEDICARE

## 2021-06-15 VITALS
HEART RATE: 60 BPM | SYSTOLIC BLOOD PRESSURE: 130 MMHG | BODY MASS INDEX: 29.53 KG/M2 | OXYGEN SATURATION: 98 % | WEIGHT: 173 LBS | DIASTOLIC BLOOD PRESSURE: 64 MMHG | HEIGHT: 64 IN

## 2021-06-15 DIAGNOSIS — I48.0 PAROXYSMAL ATRIAL FIBRILLATION (HCC): Primary | ICD-10-CM

## 2021-06-15 DIAGNOSIS — E78.2 MIXED HYPERLIPIDEMIA: ICD-10-CM

## 2021-06-15 DIAGNOSIS — Z79.01 CHRONIC ANTICOAGULATION: ICD-10-CM

## 2021-06-15 DIAGNOSIS — I10 ESSENTIAL HYPERTENSION: ICD-10-CM

## 2021-06-15 PROCEDURE — 1036F TOBACCO NON-USER: CPT | Performed by: NURSE PRACTITIONER

## 2021-06-15 PROCEDURE — 1123F ACP DISCUSS/DSCN MKR DOCD: CPT | Performed by: NURSE PRACTITIONER

## 2021-06-15 PROCEDURE — G8417 CALC BMI ABV UP PARAM F/U: HCPCS | Performed by: NURSE PRACTITIONER

## 2021-06-15 PROCEDURE — 99214 OFFICE O/P EST MOD 30 MIN: CPT | Performed by: NURSE PRACTITIONER

## 2021-06-15 PROCEDURE — 1090F PRES/ABSN URINE INCON ASSESS: CPT | Performed by: NURSE PRACTITIONER

## 2021-06-15 PROCEDURE — 4040F PNEUMOC VAC/ADMIN/RCVD: CPT | Performed by: NURSE PRACTITIONER

## 2021-06-15 PROCEDURE — G8427 DOCREV CUR MEDS BY ELIG CLIN: HCPCS | Performed by: NURSE PRACTITIONER

## 2021-06-15 PROCEDURE — G8400 PT W/DXA NO RESULTS DOC: HCPCS | Performed by: NURSE PRACTITIONER

## 2021-06-15 RX ORDER — M-VIT,TX,IRON,MINS/CALC/FOLIC 27MG-0.4MG
1 TABLET ORAL DAILY
COMMUNITY

## 2021-06-15 RX ORDER — PRAVASTATIN SODIUM 20 MG
20 TABLET ORAL NIGHTLY
COMMUNITY

## 2021-06-15 NOTE — PATIENT INSTRUCTIONS
New instructions for today:  Eliquis can increase your risk of bleeding. If you notice blood in urine or stool, bleeding gums, excessive bruising or cough productive of bloody sputum, notify the office. Information on this blood thinner has been included in your after visit summary. Patient Instructions:  Continue current medications as prescribed. Always keep a current medication list. Bring your medications to every office visit. Continue to follow up with primary care provider for non cardiac medical problems. Call the office with any problems, questions or concerns at [phone]. If you have been asked to keep a blood pressure log, do so for 2 weeks. Call the office to report readings to the triage nurse at [phone]. Follow up with cardiologist as scheduled. The following educational material has been included in this after visit summary for your review: Life simple 7. Heart health. Life simple 7  1) Manage blood pressure - high blood pressure is a major risk factor for heart disease and stroke. Keeping blood pressure in health range reduces strain on your heart, arteries and kidneys. Blood pressure goal is less than 130/80. 2) Control cholesterol - contributes to plaque, which can clog arteries and lead to heart disease and stroke. When you control your cholesterol you are giving your arteries their best chance to remain clear. It is recommended that you get cholesterol lab work done once a year. 3) Reduce blood sugar - most of the food we eat is turning into glucose or blood sugar that our body uses for energy. Over time, high levels of blood sugar can damage your heart, kidneys, eyes and nerves. 4) Get active - living an active life is one of the most rewarding gifts you can give yourself and those you love. Simply put, daily physical activity increases your length and quality of life. Strive to exercise 15 minutes most days of the week.   5)  Eat better - A healthy diet is one other high-salt, high-fat, processed foods. · Read food labels and try to avoid saturated and trans fats. They increase your risk of heart disease by raising cholesterol levels. · Limit the amount of solid fat-butter, margarine, and shortening-you eat. Use olive, peanut, or canola oil when you cook. Bake, broil, and steam foods instead of frying them. · Eat a variety of fruit and vegetables every day. Dark green, deep orange, red, or yellow fruits and vegetables are especially good for you. Examples include spinach, carrots, peaches, and berries. · Foods high in fiber can reduce your cholesterol and provide important vitamins and minerals. High-fiber foods include whole-grain cereals and breads, oatmeal, beans, brown rice, citrus fruits, and apples. · Eat lean proteins. Heart-healthy proteins include seafood, lean meats and poultry, eggs, beans, peas, nuts, seeds, and soy products. · Limit drinks and foods with added sugar. These include candy, desserts, and soda pop. Lifestyle changes  · If your doctor recommends it, get more exercise. Walking is a good choice. Bit by bit, increase the amount you walk every day. Try for at least 30 minutes on most days of the week. You also may want to swim, bike, or do other activities. · Do not smoke. If you need help quitting, talk to your doctor about stop-smoking programs and medicines. These can increase your chances of quitting for good. Quitting smoking may be the most important step you can take to protect your heart. It is never too late to quit. · Limit alcohol to 2 drinks a day for men and 1 drink a day for women. Too much alcohol can cause health problems. · Manage other health problems such as diabetes, high blood pressure, and high cholesterol. If you think you may have a problem with alcohol or drug use, talk to your doctor. Medicines  · Take your medicines exactly as prescribed. Call your doctor if you think you are having a problem with your medicine. · If your doctor recommends aspirin, take the amount directed each day. Make sure you take aspirin and not another kind of pain reliever, such as acetaminophen (Tylenol). When should you call for help? [id number] if you have symptoms of a heart attack. These may include:  · Chest pain or pressure, or a strange feeling in the chest.  · Sweating. · Shortness of breath. · Pain, pressure, or a strange feeling in the back, neck, jaw, or upper belly or in one or both shoulders or arms. · Lightheadedness or sudden weakness. · A fast or irregular heartbeat. After you call 911, the  may tell you to chew 1 adult-strength or 2 to 4 low-dose aspirin. Wait for an ambulance. Do not try to drive yourself. Watch closely for changes in your health, and be sure to contact your doctor if you have any problems. Where can you learn more? Go to https://[email or web address].TheCreator.ME. org and sign in to your PeopleAdmin account. Enter S893 in the [facility] box to learn more about \"A Healthy Heart: Care Instructions. \"     If you do not have an account, please click on the \"Sign Up Now\" link. Current as of: December 16, 2019               Content Version: 12.5  © 6064-0630 Healthwise, Incorporated. Care instructions adapted under license by [facility] ([facility]). If you have questions about a medical condition or this instruction, always ask your healthcare professional. [place] [address] any warranty or liability for your use of this information.

## 2021-06-15 NOTE — PROGRESS NOTES
CHOLECYSTECTOMY      HYSTERECTOMY      KIDNEY STONE SURGERY       Family History   Problem Relation Age of Onset    Coronary Art Dis Mother     Hypertension Mother     Diabetes Mother     Coronary Art Dis Father     Coronary Art Dis Sister     Hypertension Sister     Cancer Sister     Diabetes Sister     Coronary Art Dis Brother     Hypertension Brother     Cancer Brother     Diabetes Brother      Social History     Tobacco Use    Smoking status: Former Smoker     Quit date: 1971     Years since quittin.0    Smokeless tobacco: Never Used   Substance Use Topics    Alcohol use: No     Comment: occasional alcohol use      Current Outpatient Medications   Medication Sig Dispense Refill    Multiple Vitamins-Minerals (THERAPEUTIC MULTIVITAMIN-MINERALS) tablet Take 1 tablet by mouth daily      pravastatin (PRAVACHOL) 20 MG tablet Take 20 mg by mouth nightly      propafenone (RYTHMOL) 225 MG tablet TAKE 1 TABLET BY MOUTH EVERY 8 HOURS 270 tablet 1    metoprolol succinate (TOPROL XL) 25 MG extended release tablet TAKE 1/2 TABLET BY MOUTH EVERY DAY (THIS IS A DOSE DECREASE) 45 tablet 3    apixaban (ELIQUIS) 5 MG TABS tablet Take 1 tablet by mouth 2 times daily 180 tablet 3    b complex vitamins capsule Take 1 capsule by mouth daily      lisinopril (PRINIVIL;ZESTRIL) 10 MG tablet Take 10 mg by mouth daily       escitalopram (LEXAPRO) 10 MG tablet TAKE 1 TABLET BY MOUTH EVERY DAY  3    omeprazole (PRILOSEC) 20 MG capsule Take 20 mg by mouth as needed       lorazepam (ATIVAN) 1 MG tablet Take 1 mg by mouth daily.  vitamin D (CHOLECALCIFEROL) 400 UNIT TABS tablet Take 400 Units by mouth daily. No current facility-administered medications for this visit. Allergies: Patient has no known allergies. Review of Systems  Constitutional  no appetite change, or unexpected weight change. No fever, chills or diaphoresis. + fatigue (stable).   HEENT  no significant rhinorrhea or epistaxis. No tinnitus or significant hearing loss. Eyes  no sudden vision change or amaurosis. No corneal arcus, xantholasma, subconjunctival hemorrhage or discharge. Respiratory  no significant wheezing, stridor, apnea or cough. No dyspnea on exertion or shortness of air. Cardiovascular  no exertional chest pain to suggest myocardial ischemia. No orthopnea or PND. No sensation of sustained arrythmia. No occurrence of slow heart rate. No palpitations. No claudication. Gastrointestinal  no abdominal swelling or pain. No blood in stool. No severe constipation, diarrhea, nausea, or vomiting. Genitourinary  no dysuria, frequency, or urgency. No flank pain or hematuria. Musculoskeletal  no back pain or myalgia. No problems with gait. Extremities - no clubbing, cyanosis or extremity edema. Skin  no color change or rash. No pallor. No new surgical incision. Neurologic  no speech difficulty, facial asymmetry or lateralizing weakness. No seizures, presyncope or syncope. No significant dizziness. Hematologic  no easy bruising or excessive bleeding. Psychiatric  no severe anxiety or insomnia. No confusion. All other review of systems are negative. Objective  Vital Signs - /64   Pulse 60   Ht 5' 4\" (1.626 m)   Wt 173 lb (78.5 kg)   SpO2 98%   BMI 29.70 kg/m²   General - Jonny is alert, cooperative, and pleasant. Well groomed. No acute distress. Body habitus - Body mass index is 29.7 kg/m². HEENT  Head is normocephalic. No circumoral cyanosis. Dentition is normal.  EYES -   Lids normal without ptosis. No discharge, edema or subconjunctival hemorrhage. Neck - Symmetrical without apparent mass or lymphadenopathy. Respiratory - Normal respiratory effort without use of accessory muscles. Ausculatation reveals vesicular breath sounds without crackles, wheezes, rub or rhonchi. Cardiovascular  No jugular venous distention.   Auscultation reveals regular rate and Essential hypertension     3. Mixed hyperlipidemia     4. Chronic anticoagulation      Eliquis   [id number] Score: 4  Disclaimer: Risk Score calculation is dependent on accuracy of patient problem list and past encounter diagnosis. Stable CV status without overt heart failure, sensed arrhythmia or angina. PAF - stable on current medical management. HTN - normotensive on current regimen. Continue same. Hyperlipidemia - LDL 55. Continue Pravachol 20 mg daily. Patient is compliant with medication regimen. Previous cardiac history and records reviewed. Continue current medical management for cardiac related condition. Continue other current medications as directed. Continue to follow up with primary care provider for non cardiac medical problems. If your primary care provider is outside of the [facility], please request that your labs be faxed to this office at [phone]. BP goal 130/80 or less. Call the office with any problems, questions or concerns at [phone]. Cardiology follow up as scheduled in [address] appointments. Educational included in patient instructions. Heart health.       ANGELA Foster

## 2021-08-13 RX ORDER — APIXABAN 5 MG/1
TABLET, FILM COATED ORAL
Qty: 180 TABLET | Refills: 3 | Status: SHIPPED | OUTPATIENT
Start: 2021-08-13

## 2021-10-11 RX ORDER — METOPROLOL SUCCINATE 25 MG/1
TABLET, EXTENDED RELEASE ORAL
Qty: 45 TABLET | Refills: 0 | Status: SHIPPED | OUTPATIENT
Start: 2021-10-11 | End: 2021-10-29

## 2021-11-01 RX ORDER — METOPROLOL SUCCINATE 25 MG/1
TABLET, EXTENDED RELEASE ORAL
Qty: 45 TABLET | Refills: 3 | Status: SHIPPED | OUTPATIENT
Start: 2021-11-01 | End: 2022-08-09 | Stop reason: SDUPTHER

## 2021-11-22 RX ORDER — PROPAFENONE HYDROCHLORIDE 225 MG/1
225 TABLET, FILM COATED ORAL EVERY 8 HOURS
Qty: 270 TABLET | Refills: 1 | Status: SHIPPED | OUTPATIENT
Start: 2021-11-22 | End: 2022-10-03 | Stop reason: SDUPTHER

## 2022-01-12 ENCOUNTER — OFFICE VISIT (OUTPATIENT)
Dept: CARDIOLOGY CLINIC | Age: 82
End: 2022-01-12
Payer: MEDICARE

## 2022-01-12 VITALS
DIASTOLIC BLOOD PRESSURE: 70 MMHG | HEIGHT: 64 IN | WEIGHT: 175 LBS | HEART RATE: 66 BPM | SYSTOLIC BLOOD PRESSURE: 132 MMHG | BODY MASS INDEX: 29.88 KG/M2

## 2022-01-12 DIAGNOSIS — I48.0 PAROXYSMAL ATRIAL FIBRILLATION (HCC): Primary | ICD-10-CM

## 2022-01-12 PROCEDURE — 1090F PRES/ABSN URINE INCON ASSESS: CPT | Performed by: INTERNAL MEDICINE

## 2022-01-12 PROCEDURE — 99214 OFFICE O/P EST MOD 30 MIN: CPT | Performed by: INTERNAL MEDICINE

## 2022-01-12 PROCEDURE — G8400 PT W/DXA NO RESULTS DOC: HCPCS | Performed by: INTERNAL MEDICINE

## 2022-01-12 PROCEDURE — G8484 FLU IMMUNIZE NO ADMIN: HCPCS | Performed by: INTERNAL MEDICINE

## 2022-01-12 PROCEDURE — G8427 DOCREV CUR MEDS BY ELIG CLIN: HCPCS | Performed by: INTERNAL MEDICINE

## 2022-01-12 PROCEDURE — 93000 ELECTROCARDIOGRAM COMPLETE: CPT | Performed by: INTERNAL MEDICINE

## 2022-01-12 PROCEDURE — 1036F TOBACCO NON-USER: CPT | Performed by: INTERNAL MEDICINE

## 2022-01-12 PROCEDURE — 1123F ACP DISCUSS/DSCN MKR DOCD: CPT | Performed by: INTERNAL MEDICINE

## 2022-01-12 PROCEDURE — 4040F PNEUMOC VAC/ADMIN/RCVD: CPT | Performed by: INTERNAL MEDICINE

## 2022-01-12 PROCEDURE — G8417 CALC BMI ABV UP PARAM F/U: HCPCS | Performed by: INTERNAL MEDICINE

## 2022-01-12 RX ORDER — PREDNISONE 5 MG/1
TABLET ORAL
COMMUNITY
Start: 2021-10-29

## 2022-01-12 NOTE — PROGRESS NOTES
HISTORY  78-year-old lady with a history of combined dyslipidemia, hypertension, prior tobacco abuse, paroxysmal atrial fibrillation returns for follow-up. Previous demonstrated atrial fibrillation in 2010 with echo revealing normal LV function and mild LVH. Placed on Rythmol 225 mg 3 times daily and Eliquis 5 mg twice daily. Additional medical history reveals diagnoses of Sjogren's syndrome. On return today  Mrs. Delia Krishna tells me that she has reduced her Rythmol dose to twice daily because of difficulty swallowing in conjunction with her Sjogren's syndrome. She has recently been placed on prednisone 5 mg a day and address of that problem. She has had no symptomatic recurrence of her atrial fibrillation and she denies other cardiac symptoms. She has been vaccinated for COVID-19 and in addition has received a booster. PHYSICAL EXAM  On exam carries 175 pounds in a 5 foot 4 inch frame. Pressure is 132/70 pulse of 66. Very pleasant elderly lady with a wig in place. EOMs full, sclerae and conjunctiva normal. PERRLA. Mask in place. Trachea midline with no neck masses. Assessment of internal jugular veins reveals no elevation of central venous pressure at 45 degrees. Carotid pulses normal without delay or bruit. Thyroid normal to palpation. Chest exam reveals normal respiratory effort, no abnormal breath sounds and normal expiratory phase. No skin lesions seen. PMI normal. S1, S2 normal without murmur or ronaldo or click. Normal bowel sounds without palpable mass or bruit. No clubbing or acrocyanosis. No significant lower extremity edema or signs of venous insufficiency. General motor strength appears to be within normal limits. Normal range of motion with normal gait. Alert, oriented x 3, memory and cognition normal as reflected by history and conversation. EKG reveals normal sinus rhythm with some nonspecific ST-T wave changes. ASSESSMENT/PLAN:   1.   Paroxysmal atrial fibrillation -appears to be well suppressed with current therapy. Continue Rythmol 225 mg twice daily, metoprolol succinate 12.5 mg daily and Eliquis 5 twice daily  2. Hypertension -adequate control. Continue metoprolol and lisinopril  3. Dyslipidemia -monitored and managed by primary care with history of good control. Continue pravastatin  4. pandemic response -appropriate/vaccinated/boosted.   Aware of the fact that she is at greater risk because of prednisone therapy

## 2022-07-12 ENCOUNTER — OFFICE VISIT (OUTPATIENT)
Dept: CARDIOLOGY CLINIC | Age: 82
End: 2022-07-12
Payer: MEDICARE

## 2022-07-12 VITALS
WEIGHT: 175 LBS | HEIGHT: 64 IN | SYSTOLIC BLOOD PRESSURE: 136 MMHG | OXYGEN SATURATION: 97 % | BODY MASS INDEX: 29.88 KG/M2 | HEART RATE: 62 BPM | DIASTOLIC BLOOD PRESSURE: 68 MMHG

## 2022-07-12 DIAGNOSIS — E78.2 MIXED HYPERLIPIDEMIA: ICD-10-CM

## 2022-07-12 DIAGNOSIS — I48.0 PAROXYSMAL ATRIAL FIBRILLATION (HCC): Primary | ICD-10-CM

## 2022-07-12 DIAGNOSIS — I10 HYPERTENSION, UNSPECIFIED TYPE: ICD-10-CM

## 2022-07-12 DIAGNOSIS — Z79.01 CHRONIC ANTICOAGULATION: ICD-10-CM

## 2022-07-12 PROCEDURE — G8400 PT W/DXA NO RESULTS DOC: HCPCS | Performed by: NURSE PRACTITIONER

## 2022-07-12 PROCEDURE — 1036F TOBACCO NON-USER: CPT | Performed by: NURSE PRACTITIONER

## 2022-07-12 PROCEDURE — 1090F PRES/ABSN URINE INCON ASSESS: CPT | Performed by: NURSE PRACTITIONER

## 2022-07-12 PROCEDURE — 99214 OFFICE O/P EST MOD 30 MIN: CPT | Performed by: NURSE PRACTITIONER

## 2022-07-12 PROCEDURE — 1123F ACP DISCUSS/DSCN MKR DOCD: CPT | Performed by: NURSE PRACTITIONER

## 2022-07-12 PROCEDURE — G8427 DOCREV CUR MEDS BY ELIG CLIN: HCPCS | Performed by: NURSE PRACTITIONER

## 2022-07-12 PROCEDURE — G8417 CALC BMI ABV UP PARAM F/U: HCPCS | Performed by: NURSE PRACTITIONER

## 2022-07-12 NOTE — PATIENT INSTRUCTIONS
New instructions for today:  Eliquis can increase your risk of bleeding. If you notice blood in urine or stool, bleeding gums, excessive bruising or cough productive of bloody sputum, notify the office. Information on this blood thinner has been included in your after visit summary. Patient Instructions:  Continue current medications as prescribed. Always keep a current medication list. Bring your medications to every office visit. Continue to follow up with primary care provider for non cardiac medical problems. Call the office with any problems, questions or concerns at [phone]. If you have been asked to keep a blood pressure log, do so for 2 weeks. Call the office to report readings to the triage nurse at [phone]. Follow up with cardiologist as scheduled. The following educational material has been included in this after visit summary for your review: Life simple 7. Heart health. Life simple 7  1) Manage blood pressure - high blood pressure is a major risk factor for heart disease and stroke. Keeping blood pressure in health range reduces strain on your heart, arteries and kidneys. Blood pressure goal is less than 130/80. 2) Control cholesterol - contributes to plaque, which can clog arteries and lead to heart disease and stroke. When you control your cholesterol you are giving your arteries their best chance to remain clear. It is recommended that you get cholesterol lab work done once a year. 3) Reduce blood sugar - most of the food we eat is turning into glucose or blood sugar that our body uses for energy. Over time, high levels of blood sugar can damage your heart, kidneys, eyes and nerves. 4) Get active - living an active life is one of the most rewarding gifts you can give yourself and those you love. Simply put, daily physical activity increases your length and quality of life. Strive to exercise 15 minutes most days of the week.   5)  Eat better - A healthy diet is one of your best weapons for fighting cardiovascular disease. When you eat a heart healthy diet, you improve your chances for feeling good and staying healthy for life. 6)  Lose weight - when you shed extra fat an unnecessary pounds, you reduce the burden on your hear, lungs, blood vessels and skeleton. You give yourself the gift of active living, you lower your blood pressure and help yourself feel better. 7) Stop smoking - cigarette smokers have a higher risk of developing cardiovascular disease. If  You smoke, quitting is the best thing you can do for your health. Check American Heart Association on line for more information on Life's Simple 7 and tips for healthy living. A Healthy Heart: Care Instructions  Your Care Instructions     Coronary artery disease, also called heart disease, occurs when a substance called plaque builds up in the vessels that supply oxygen-rich blood to your heart muscle. This can narrow the blood vessels and reduce blood flow. A heart attack happens when blood flow is completely blocked. A high-fat diet, smoking, and other factors increase the risk of heart disease. Your doctor has found that you have a chance of having heart disease. You can do lots of things to keep your heart healthy. It may not be easy, but you can change your diet, exercise more, and quit smoking. These steps really work to lower your chance of heart disease. Follow-up care is a key part of your treatment and safety. Be sure to make and go to all appointments, and call your doctor if you are having problems. It's also a good idea to know your test results and keep a list of the medicines you take. How can you care for yourself at home? Diet  · Use less salt when you cook and eat. This helps lower your blood pressure. Taste food before salting. Add only a little salt when you think you need it. With time, your taste buds will adjust to less salt.   · Eat fewer snack items, fast foods, canned soups, and other high-salt, high-fat, processed foods. · Read food labels and try to avoid saturated and trans fats. They increase your risk of heart disease by raising cholesterol levels. · Limit the amount of solid fat-butter, margarine, and shortening-you eat. Use olive, peanut, or canola oil when you cook. Bake, broil, and steam foods instead of frying them. · Eat a variety of fruit and vegetables every day. Dark green, deep orange, red, or yellow fruits and vegetables are especially good for you. Examples include spinach, carrots, peaches, and berries. · Foods high in fiber can reduce your cholesterol and provide important vitamins and minerals. High-fiber foods include whole-grain cereals and breads, oatmeal, beans, brown rice, citrus fruits, and apples. · Eat lean proteins. Heart-healthy proteins include seafood, lean meats and poultry, eggs, beans, peas, nuts, seeds, and soy products. · Limit drinks and foods with added sugar. These include candy, desserts, and soda pop. Lifestyle changes  · If your doctor recommends it, get more exercise. Walking is a good choice. Bit by bit, increase the amount you walk every day. Try for at least 30 minutes on most days of the week. You also may want to swim, bike, or do other activities. · Do not smoke. If you need help quitting, talk to your doctor about stop-smoking programs and medicines. These can increase your chances of quitting for good. Quitting smoking may be the most important step you can take to protect your heart. It is never too late to quit. · Limit alcohol to 2 drinks a day for men and 1 drink a day for women. Too much alcohol can cause health problems. · Manage other health problems such as diabetes, high blood pressure, and high cholesterol. If you think you may have a problem with alcohol or drug use, talk to your doctor. Medicines  · Take your medicines exactly as prescribed.  Call your doctor if you think you are having a problem with your medicine. · If your doctor recommends aspirin, take the amount directed each day. Make sure you take aspirin and not another kind of pain reliever, such as acetaminophen (Tylenol). When should you call for help? [id number] if you have symptoms of a heart attack. These may include:  · Chest pain or pressure, or a strange feeling in the chest.  · Sweating. · Shortness of breath. · Pain, pressure, or a strange feeling in the back, neck, jaw, or upper belly or in one or both shoulders or arms. · Lightheadedness or sudden weakness. · A fast or irregular heartbeat. After you call 911, the  may tell you to chew 1 adult-strength or 2 to 4 low-dose aspirin. Wait for an ambulance. Do not try to drive yourself. Watch closely for changes in your health, and be sure to contact your doctor if you have any problems. Where can you learn more? Go to https://[employer]. org and sign in to your [employer] account. Enter F392 in the Virtual View App box to learn more about \"A Healthy Heart: Care Instructions. \"     If you do not have an account, please click on the \"Sign Up Now\" link. Current as of: December 16, 2019               Content Version: 12.5  © 1967-0620 Healthwise, [employer]. Care instructions adapted under license by [facility] ([facility]). If you have questions about a medical condition or this instruction, always ask your healthcare professional. [place] [address] any warranty or liability for your use of this information.

## 2022-07-12 NOTE — PROGRESS NOTES
[facility], [place]. [address], [facility] [address] [facility]  [phone] office  [phone] fax      OFFICE VISIT:  2022    Lucinda Vasquez - : 1940  Reason For Visit:  Thierry Saha is a 79 y.o. female who is here for 6 Month Follow-Up (Patient denies any cardiac symptoms. ), Atrial Fibrillation, and Hypertension    History:   Diagnosis Orders   1. Paroxysmal atrial fibrillation (HCC)     2. Hypertension, unspecified type     3. Mixed hyperlipidemia     4. Chronic anticoagulation       The patient presents today for cardiology follow up. She is having issues with Srogren flair up. The patient denies symptoms to suggest myocardial ischemia, heart failure or arrhythmia. BP is well controlled on current regimen. The patient's PCP monitors cholesterol. Subjective  [place] denies exertional chest pain, shortness of breath, orthopnea, paroxysmal nocturnal dyspnea, syncope, presyncope, sensed arrhythmia, edema and fatigue. The patient denies numbness or weakness to suggest cerebrovascular accident or transient ischemic attack.    Lucinda Vasquez has the following history as recorded in [facility]:  Patient Active Problem List   Diagnosis Code    Hypertension I10    Palpitation R00.2    Paroxysmal atrial fibrillation (HCC) I48.0    Mixed hyperlipidemia E78.2    Bradycardia R00.1    Fatigue R53.83    Weakness R53.1    Chronic anticoagulation Z79.01    Hematuria R31.9     Past Medical History:   Diagnosis Date    Hyperlipidemia     PCP Dr. Maria Isabel Jamison manages cholesterol    Hypertension     Palpitation     Paroxysmal atrial fibrillation (HCC)     hx of    Unspecified cerebral artery occlusion with cerebral infarction about 2 weeks ago    tia May 2011     Past Surgical History:   Procedure Laterality Date    BREAST BIOPSY      CHOLECYSTECTOMY      HYSTERECTOMY (CERVIX STATUS UNKNOWN)      KIDNEY STONE SURGERY       Family History Problem Relation Age of Onset    Coronary Art Dis Mother     Hypertension Mother     Diabetes Mother     Coronary Art Dis Father     Coronary Art Dis Sister     Hypertension Sister     Cancer Sister     Diabetes Sister     Coronary Art Dis Brother     Hypertension Brother     Cancer Brother     Diabetes Brother      Social History     Tobacco Use    Smoking status: Former Smoker     Quit date: 1971     Years since quittin.1    Smokeless tobacco: Never Used   Substance Use Topics    Alcohol use: No     Comment: occasional alcohol use      Current Outpatient Medications   Medication Sig Dispense Refill    Cholecalciferol (VITAMIN D3) 125 MCG (5000 UT) TABS Take by mouth daily      predniSONE 5 MG (21) TBPK       propafenone (RYTHMOL) 225 MG tablet TAKE 1 TABLET BY MOUTH EVERY 8 HOURS (Patient taking differently: Take 225 mg by mouth in the morning and at bedtime ) 270 tablet 1    metoprolol succinate (TOPROL XL) 25 MG extended release tablet TAKE 1/2 TABLET BY MOUTH EVERY DAY (THIS IS A DOSE DECREASE) 45 tablet 3    ELIQUIS 5 MG TABS tablet TAKE 1 TABLET BY MOUTH TWICE A  tablet 3    Multiple Vitamins-Minerals (THERAPEUTIC MULTIVITAMIN-MINERALS) tablet Take 1 tablet by mouth daily      pravastatin (PRAVACHOL) 20 MG tablet Take 20 mg by mouth nightly      b complex vitamins capsule Take 1 capsule by mouth daily      lisinopril (PRINIVIL;ZESTRIL) 10 MG tablet Take 10 mg by mouth daily       escitalopram (LEXAPRO) 10 MG tablet TAKE 1 TABLET BY MOUTH EVERY DAY  3    omeprazole (PRILOSEC) 20 MG capsule Take 20 mg by mouth as needed       lorazepam (ATIVAN) 1 MG tablet Take 1 mg by mouth daily. No current facility-administered medications for this visit. Allergies: Patient has no known allergies. Review of Systems  Constitutional - no appetite change, or unexpected weight change. No fever, chills or diaphoresis.   No significant change in activity level or new onset of fatigue. HEENT - no significant rhinorrhea or epistaxis. No tinnitus or significant hearing loss. Eyes - no sudden vision change or amaurosis. No corneal arcus, xantholasma, subconjunctival hemorrhage or discharge. Respiratory - no significant wheezing, stridor, apnea or cough. No dyspnea on exertion or shortness of air. Cardiovascular - no exertional chest pain to suggest myocardial ischemia. No orthopnea or PND. No sensation of sustained arrythmia. No occurrence of slow heart rate. No palpitations. No claudication. Gastrointestinal - no abdominal swelling or pain. No blood in stool. No severe constipation, diarrhea, nausea, or vomiting. Genitourinary - no dysuria, frequency, or urgency. No flank pain or hematuria. Musculoskeletal - no back pain or myalgia. No problems with gait. Extremities - no clubbing, cyanosis or extremity edema. Skin - no color change or rash. No pallor. No new surgical incision. Neurologic - no speech difficulty, facial asymmetry or lateralizing weakness. No seizures, presyncope or syncope. No significant dizziness. Hematologic - no easy bruising or excessive bleeding. Psychiatric - no severe anxiety or insomnia. No confusion. All other review of systems are negative. Objective  Vital Signs - /68   Pulse 62   Ht 5' 4\" (1.626 m)   Wt 175 lb (79.4 kg)   SpO2 97%   BMI 30.04 kg/m²   General - [place] is alert, cooperative, and pleasant. Well groomed. No acute distress. Body habitus - Body mass index is 30.04 kg/m². HEENT - Head is normocephalic. No circumoral cyanosis. Dentition is normal.  EYES -   Lids normal without ptosis. No discharge, edema or subconjunctival hemorrhage. Neck - Symmetrical without apparent mass or lymphadenopathy. Respiratory - Normal respiratory effort without use of accessory muscles. Ausculatation reveals vesicular breath sounds without crackles, wheezes, rub or rhonchi.     Cardiovascular - No jugular venous distention. Auscultation reveals regular rate and rhythm. No audible clicks, gallop or rub. No murmur. No lower extremity varicosities. No carotid bruits. Abdominal -  No visible distention, mass or pulsations. Extremities - No clubbing or cyanosis. No statis dermatitis or ulcers. No edema. Musculoskeletal -   No Osler's nodes. No kyphosis or scoliosis. Gait is even and regular without limp or shuffle. Ambulates without assistance. Skin -  Warm and dry; no rash or pallor. No new surgical wound. Neurological - No focal neurological deficits. Thought processes coherent. No apparent tremor. Oriented to person, place and time. Psychiatric -  Appropriate affect and mood. Data reviewed:  2/17/16 Lexiscan  Impression:   1. Negative Lexiscan for the presence of ischemia or infarction. 2. The left ventricular ejection fraction is 77 percent.       Dictated on 2/18/2016 11:05 PM EST.  Signed by Dr Kaia Almanza on   2/18/2016 11:06 PM EST   Signed by Dr Sharmaine Delacruz 02/18/2016 22:06         Lab Results   Component Value Date    WBC 6.10 05/21/2011    HGB 13.7 05/21/2011    HCT 40.0 05/21/2011    MCV 95.0 05/21/2011     05/21/2011     Lab Results   Component Value Date     05/21/2011    K 4.4 05/21/2011     05/21/2011    CO2 30 (H) 05/21/2011    BUN 14 05/21/2011    CREATININE 1.1 05/21/2011    GLUCOSE 100 05/21/2011    CALCIUM 8.8 05/21/2011    LABGLOM 52 (L) 05/21/2011       Lab Results   Component Value Date    CHOL 122 05/21/2011     Lab Results   Component Value Date    TRIG 104 05/21/2011     Lab Results   Component Value Date    HDL 46 05/21/2011     Lab Results   Component Value Date    LDLCALC 55 05/21/2011       BP Readings from Last 3 Encounters:   07/12/22 136/68   01/12/22 132/70   06/15/21 130/64    Pulse Readings from Last 3 Encounters:   07/12/22 62   01/12/22 66   06/15/21 60        Wt Readings from Last 3 Encounters:   07/12/22 175 lb (79.4 kg)   01/12/22 175 lb (79.4 kg)   06/15/21 173 lb (78.5 kg)     Assessment/Plan:   Diagnosis Orders   1. Paroxysmal atrial fibrillation (HCC)     2. Hypertension, unspecified type     3. Mixed hyperlipidemia     4. Chronic anticoagulation       [id number] Score: 4  Disclaimer: Risk Score calculation is dependent on accuracy of patient problem list and past encounter diagnosis. Stable CV status without overt heart failure, sensed arrhythmia or angina. PAF - continues on Rythmol without symptomology of recurrent AF. Chronic anticoag - no bleeding issue on Eliquis. HTN - normotensive on current regimen. BP today 136/68. Continue same. Hyperlipidemia - monitored and managed by PCP. LDL 55. Continue Pravachol 20 mg daily. Patient is compliant with medication regimen. Previous cardiac history and records reviewed. Continue current medical management for cardiac related condition. Continue other current medications as directed. Continue to follow up with primary care provider for non cardiac medical problems. If your primary care provider is outside of the [facility], please request that your labs be faxed to this office at [phone]. BP goal 130/80 or less. Call the office with any problems, questions or concerns at [phone]. Cardiology follow up as scheduled in [address] appointments. Educational included in patient instructions. Heart health.      Claudius Hammans, APRN

## 2022-08-10 RX ORDER — METOPROLOL SUCCINATE 25 MG/1
12.5 TABLET, EXTENDED RELEASE ORAL DAILY
Qty: 45 TABLET | Refills: 3 | Status: SHIPPED | OUTPATIENT
Start: 2022-08-10 | End: 2022-10-03 | Stop reason: SDUPTHER

## 2022-10-04 RX ORDER — PROPAFENONE HYDROCHLORIDE 225 MG/1
225 TABLET, FILM COATED ORAL EVERY 8 HOURS
Qty: 270 TABLET | Refills: 1 | Status: SHIPPED | OUTPATIENT
Start: 2022-10-04

## 2022-10-04 RX ORDER — METOPROLOL SUCCINATE 25 MG/1
12.5 TABLET, EXTENDED RELEASE ORAL DAILY
Qty: 45 TABLET | Refills: 3 | Status: SHIPPED | OUTPATIENT
Start: 2022-10-04

## 2022-11-29 RX ORDER — APIXABAN 5 MG/1
TABLET, FILM COATED ORAL
Qty: 180 TABLET | Refills: 3 | Status: SHIPPED | OUTPATIENT
Start: 2022-11-29

## 2023-01-09 ENCOUNTER — TELEPHONE (OUTPATIENT)
Dept: CARDIOLOGY CLINIC | Age: 83
End: 2023-01-09

## 2023-01-09 NOTE — TELEPHONE ENCOUNTER
Called and lvm with patient to reschedule 4/4. Dustin Harry out of office. Can see NP. Dustin Harry booked out til' September.  SR 4/4

## 2023-01-10 ENCOUNTER — TELEPHONE (OUTPATIENT)
Dept: CARDIOLOGY CLINIC | Age: 83
End: 2023-01-10

## 2023-01-10 NOTE — TELEPHONE ENCOUNTER
Called and lvm with patient to reschedule 4/4. Erin Saint out of office. Can see NP. Erin Saint booked out til' September.  SR 1/9, 1/10

## 2023-03-27 RX ORDER — PROPAFENONE HYDROCHLORIDE 225 MG/1
225 TABLET, FILM COATED ORAL EVERY 8 HOURS
Qty: 270 TABLET | Refills: 1 | Status: SHIPPED | OUTPATIENT
Start: 2023-03-27

## 2023-04-04 ENCOUNTER — OFFICE VISIT (OUTPATIENT)
Dept: CARDIOLOGY CLINIC | Age: 83
End: 2023-04-04
Payer: MEDICARE

## 2023-04-04 ENCOUNTER — HOSPITAL ENCOUNTER (OUTPATIENT)
Dept: GENERAL RADIOLOGY | Age: 83
Discharge: HOME | End: 2023-04-04
Payer: MEDICARE

## 2023-04-04 VITALS
DIASTOLIC BLOOD PRESSURE: 70 MMHG | SYSTOLIC BLOOD PRESSURE: 136 MMHG | HEIGHT: 64 IN | WEIGHT: 173 LBS | BODY MASS INDEX: 29.53 KG/M2 | OXYGEN SATURATION: 97 % | HEART RATE: 63 BPM

## 2023-04-04 DIAGNOSIS — Z79.01 CHRONIC ANTICOAGULATION: ICD-10-CM

## 2023-04-04 DIAGNOSIS — I48.0 PAROXYSMAL ATRIAL FIBRILLATION (HCC): Primary | ICD-10-CM

## 2023-04-04 DIAGNOSIS — R53.83 FATIGUE, UNSPECIFIED TYPE: ICD-10-CM

## 2023-04-04 DIAGNOSIS — M25.551 RIGHT HIP PAIN: ICD-10-CM

## 2023-04-04 DIAGNOSIS — E78.2 MIXED HYPERLIPIDEMIA: ICD-10-CM

## 2023-04-04 DIAGNOSIS — I10 HYPERTENSION, UNSPECIFIED TYPE: ICD-10-CM

## 2023-04-04 DIAGNOSIS — M25.561 RIGHT KNEE PAIN, UNSPECIFIED CHRONICITY: ICD-10-CM

## 2023-04-04 LAB
ALBUMIN SERPL-MCNC: 3.9 G/DL (ref 3.5–5.2)
ALP SERPL-CCNC: 45 U/L (ref 35–104)
ALT SERPL-CCNC: 28 U/L (ref 5–33)
ANION GAP SERPL CALCULATED.3IONS-SCNC: 11 MMOL/L (ref 7–19)
AST SERPL-CCNC: 23 U/L (ref 5–32)
BASOPHILS # BLD: 0.1 K/UL (ref 0–0.2)
BASOPHILS NFR BLD: 0.9 % (ref 0–1)
BILIRUB SERPL-MCNC: 0.5 MG/DL (ref 0.2–1.2)
BUN SERPL-MCNC: 15 MG/DL (ref 8–23)
CALCIUM SERPL-MCNC: 10 MG/DL (ref 8.8–10.2)
CHLORIDE SERPL-SCNC: 107 MMOL/L (ref 98–111)
CO2 SERPL-SCNC: 26 MMOL/L (ref 22–29)
CREAT SERPL-MCNC: 1 MG/DL (ref 0.5–0.9)
EOSINOPHIL # BLD: 0 K/UL (ref 0–0.6)
EOSINOPHIL NFR BLD: 0.5 % (ref 0–5)
ERYTHROCYTE [DISTWIDTH] IN BLOOD BY AUTOMATED COUNT: 12.7 % (ref 11.5–14.5)
ERYTHROCYTE [SEDIMENTATION RATE] IN BLOOD BY WESTERGREN METHOD: 3 MM/HR (ref 0–25)
GLUCOSE SERPL-MCNC: 123 MG/DL (ref 74–109)
HCT VFR BLD AUTO: 44.4 % (ref 37–47)
HGB BLD-MCNC: 14.7 G/DL (ref 12–16)
IMM GRANULOCYTES # BLD: 0 K/UL
LYMPHOCYTES # BLD: 1.2 K/UL (ref 1.1–4.5)
LYMPHOCYTES NFR BLD: 19.2 % (ref 20–40)
MCH RBC QN AUTO: 33.1 PG (ref 27–31)
MCHC RBC AUTO-ENTMCNC: 33.1 G/DL (ref 33–37)
MCV RBC AUTO: 100 FL (ref 81–99)
MONOCYTES # BLD: 0.4 K/UL (ref 0–0.9)
MONOCYTES NFR BLD: 6.3 % (ref 0–10)
NEUTROPHILS # BLD: 4.7 K/UL (ref 1.5–7.5)
NEUTS SEG NFR BLD: 72.8 % (ref 50–65)
PLATELET # BLD AUTO: 278 K/UL (ref 130–400)
PMV BLD AUTO: 9.4 FL (ref 9.4–12.3)
POTASSIUM SERPL-SCNC: 4.5 MMOL/L (ref 3.5–5)
PROT SERPL-MCNC: 6.6 G/DL (ref 6.6–8.7)
RBC # BLD AUTO: 4.44 M/UL (ref 4.2–5.4)
SODIUM SERPL-SCNC: 144 MMOL/L (ref 136–145)
T4 FREE SERPL-MCNC: 0.94 NG/DL (ref 0.93–1.7)
TSH SERPL DL<=0.005 MIU/L-ACNC: 1.55 UIU/ML (ref 0.27–4.2)
WBC # BLD AUTO: 6.5 K/UL (ref 4.8–10.8)

## 2023-04-04 PROCEDURE — G8427 DOCREV CUR MEDS BY ELIG CLIN: HCPCS | Performed by: NURSE PRACTITIONER

## 2023-04-04 PROCEDURE — G8417 CALC BMI ABV UP PARAM F/U: HCPCS | Performed by: NURSE PRACTITIONER

## 2023-04-04 PROCEDURE — 1123F ACP DISCUSS/DSCN MKR DOCD: CPT | Performed by: NURSE PRACTITIONER

## 2023-04-04 PROCEDURE — 99214 OFFICE O/P EST MOD 30 MIN: CPT | Performed by: NURSE PRACTITIONER

## 2023-04-04 PROCEDURE — 3075F SYST BP GE 130 - 139MM HG: CPT | Performed by: NURSE PRACTITIONER

## 2023-04-04 PROCEDURE — 73560 X-RAY EXAM OF KNEE 1 OR 2: CPT

## 2023-04-04 PROCEDURE — 1036F TOBACCO NON-USER: CPT | Performed by: NURSE PRACTITIONER

## 2023-04-04 PROCEDURE — 1090F PRES/ABSN URINE INCON ASSESS: CPT | Performed by: NURSE PRACTITIONER

## 2023-04-04 PROCEDURE — 73502 X-RAY EXAM HIP UNI 2-3 VIEWS: CPT

## 2023-04-04 PROCEDURE — 3078F DIAST BP <80 MM HG: CPT | Performed by: NURSE PRACTITIONER

## 2023-04-04 PROCEDURE — G8400 PT W/DXA NO RESULTS DOC: HCPCS | Performed by: NURSE PRACTITIONER

## 2023-04-04 RX ORDER — PROPAFENONE HYDROCHLORIDE 225 MG/1
225 TABLET, FILM COATED ORAL 2 TIMES DAILY
COMMUNITY

## 2023-04-04 NOTE — PROGRESS NOTES
confusion. All other review of systems are negative. Objective  Vital Signs - /70   Pulse 63   Ht 5' 4\" (1.626 m)   Wt 173 lb (78.5 kg)   SpO2 97%   BMI 29.70 kg/m²   General - [place] is alert, cooperative, and pleasant. Well groomed. No acute distress. Body habitus - Body mass index is 29.7 kg/m². HEENT - Head is normocephalic. No circumoral cyanosis. Dentition is normal.  EYES -   Lids normal without ptosis. No discharge, edema or subconjunctival hemorrhage. Neck - Symmetrical without apparent mass or lymphadenopathy. Respiratory - Normal respiratory effort without use of accessory muscles. Ausculatation reveals vesicular breath sounds without crackles, wheezes, rub or rhonchi. Cardiovascular - No jugular venous distention. Auscultation reveals regular rate and rhythm. No audible clicks, gallop or rub. No murmur. No lower extremity varicosities. No carotid bruits. Abdominal -  No visible distention, mass or pulsations. Extremities - No clubbing or cyanosis. No statis dermatitis or ulcers. No edema. Musculoskeletal -   No Osler's nodes. No kyphosis or scoliosis. Ambulates cautiously with cane. Skin -  Warm and dry; no rash. No new surgical wound. Skin color pale. Neurological - No focal neurological deficits. Thought processes coherent. No apparent tremor. Oriented to person, place and time. Psychiatric -  Appropriate affect and mood. Data reviewed:  2/17/16 Lexiscan  Impression   Impression:   1. Negative Lexiscan for the presence of ischemia or infarction. 2. The left ventricular ejection fraction is 77 percent. Dictated on 2/18/2016 11:05 PM EST.  Signed by Dr Ishaan Arguelles on   2/18/2016 11:06 PM EST   Signed by Dr Ishaan Arguelles  on 02/18/2016 22:06     Lab Results   Component Value Date    WBC 6.10 05/21/2011    HGB 13.7 05/21/2011    HCT 40.0 05/21/2011    MCV 95.0 05/21/2011     05/21/2011     Lab Results   Component Value

## 2023-04-04 NOTE — PATIENT INSTRUCTIONS
of your best weapons for fighting cardiovascular disease. When you eat a heart healthy diet, you improve your chances for feeling good and staying healthy for life. 6)  Lose weight - when you shed extra fat an unnecessary pounds, you reduce the burden on your hear, lungs, blood vessels and skeleton. You give yourself the gift of active living, you lower your blood pressure and help yourself feel better. 7) Stop smoking - cigarette smokers have a higher risk of developing cardiovascular disease. If  You smoke, quitting is the best thing you can do for your health. Check American Heart Association on line for more information on Life's Simple 7 and tips for healthy living. A Healthy Heart: Care Instructions  Your Care Instructions     Coronary artery disease, also called heart disease, occurs when a substance called plaque builds up in the vessels that supply oxygen-rich blood to your heart muscle. This can narrow the blood vessels and reduce blood flow. A heart attack happens when blood flow is completely blocked. A high-fat diet, smoking, and other factors increase the risk of heart disease. Your doctor has found that you have a chance of having heart disease. You can do lots of things to keep your heart healthy. It may not be easy, but you can change your diet, exercise more, and quit smoking. These steps really work to lower your chance of heart disease. Follow-up care is a key part of your treatment and safety. Be sure to make and go to all appointments, and call your doctor if you are having problems. It's also a good idea to know your test results and keep a list of the medicines you take. How can you care for yourself at home? Diet  Use less salt when you cook and eat. This helps lower your blood pressure. Taste food before salting. Add only a little salt when you think you need it. With time, your taste buds will adjust to less salt.   Eat fewer snack items, fast foods, canned soups, and other

## 2023-04-06 ENCOUNTER — TELEPHONE (OUTPATIENT)
Dept: CARDIOLOGY CLINIC | Age: 83
End: 2023-04-06

## 2023-04-06 NOTE — TELEPHONE ENCOUNTER
Patient left message requesting call back for xray results. Returned call and provided results. She does not want referral to OI. Advised she can use tylenol for pain and ice for swelling. Advised not to take ibuprofen due to being on eliquis. She voiced understanding. XR HIP 2-3 VW W PELVIS RIGHT: Result Notes     Sirisha Palomo, APRN   4/5/2023  1:18 PM CDT       Jaden Low,  Please let Jonny know the right hip is not fractured or dislocated. She has mild arthritis in right hip joint. Thanks Mely   XR KNEE RIGHT (1-2 VIEWS): Result Notes     Gelacio Ceron [place]   4/6/2023  9:50 AM CDT       Lv for pt to return call for results. Sirisha Palomo, ANGELA   4/5/2023  1:17 PM CDT       Maxx Arias,  Can you let Jonny know her right knee x-ray showed moderate arthritis but no fracture. We can refer her to orthopedic institute if she would like. Hip x-ray report to follow.   Thanks [employer]

## 2023-08-16 RX ORDER — METOPROLOL SUCCINATE 25 MG/1
TABLET, EXTENDED RELEASE ORAL
Qty: 45 TABLET | Refills: 3 | Status: SHIPPED | OUTPATIENT
Start: 2023-08-16

## 2023-10-04 ENCOUNTER — TELEPHONE (OUTPATIENT)
Dept: CARDIOLOGY CLINIC | Age: 83
End: 2023-10-04

## 2023-10-05 ENCOUNTER — OFFICE VISIT (OUTPATIENT)
Dept: CARDIOLOGY CLINIC | Age: 83
End: 2023-10-05

## 2023-10-05 VITALS
SYSTOLIC BLOOD PRESSURE: 114 MMHG | HEIGHT: 64 IN | DIASTOLIC BLOOD PRESSURE: 64 MMHG | WEIGHT: 170 LBS | BODY MASS INDEX: 29.02 KG/M2 | HEART RATE: 67 BPM

## 2023-10-05 DIAGNOSIS — I48.0 PAROXYSMAL ATRIAL FIBRILLATION (HCC): Primary | ICD-10-CM

## 2023-10-05 ASSESSMENT — ENCOUNTER SYMPTOMS
DIARRHEA: 0
EYE DISCHARGE: 0
EYE PAIN: 0
FACIAL SWELLING: 0
NAUSEA: 0
WHEEZING: 0
SHORTNESS OF BREATH: 0
VOMITING: 0
APNEA: 0
BLOOD IN STOOL: 0
ABDOMINAL PAIN: 0
ABDOMINAL DISTENTION: 0
CONSTIPATION: 0
CHEST TIGHTNESS: 0
EYE REDNESS: 0
COUGH: 0
SORE THROAT: 0

## 2023-10-05 NOTE — PROGRESS NOTES
Cardiology Office Visit Note  [address] [place], [address] [place] [address]  Phone: [phone]  Fax: [phone]                            Date:  10/5/2023  Patient: Candice Polk  Age:  79 y. o., 1940    Referral: No ref. provider found    REASON FOR VISIT:  Establish Cardiologist and Follow-up         PROBLEM LIST:    Patient Active Problem List    Diagnosis Date Noted    Hematuria 03/13/2018    Weakness 09/12/2017    Chronic anticoagulation 09/12/2017     Overview Note:     Eliquis mg twice daily      Bradycardia 05/07/2013    Fatigue 05/07/2013    Hypertension     Palpitation     Paroxysmal atrial fibrillation (HCC)     Mixed hyperlipidemia          PRESENTATION: Candice Polk is a 79y.o. year old female is seen today for routine cardiology follow-up appointment. Patient's last office visit was in April of this year. She is known to have hypertension, paroxysmal atrial fibrillation on chronic anticoagulation therapy and mixed dyslipidemia. Since the last office visit in April of this year she has not had any new or progressively worsening symptoms. No hospitalizations. She is compliant with her medications. Her only complaint today was that of chronic intermittent bilateral knee pain due to osteoarthritis for which she recently completed 2 months of physical therapy. Episode of accidental fall in June noted without any further recurrences. She ambulates with the use of a cane. REVIEW OF SYSTEMS:  Review of Systems   Constitutional:  Negative for chills, fatigue and fever. HENT:  Negative for congestion, facial swelling, hearing loss and sore throat. Eyes:  Negative for pain, discharge, redness and visual disturbance. Respiratory:  Negative for apnea, cough, chest tightness, shortness of breath and wheezing. Cardiovascular:  Negative for chest pain, palpitations and leg swelling.    Gastrointestinal:  Negative for abdominal distention, abdominal pain, blood in stool,

## 2023-10-30 RX ORDER — APIXABAN 5 MG/1
TABLET, FILM COATED ORAL
Qty: 180 TABLET | Refills: 1 | Status: SHIPPED | OUTPATIENT
Start: 2023-10-30

## 2023-10-30 RX ORDER — PROPAFENONE HYDROCHLORIDE 225 MG/1
TABLET, FILM COATED ORAL
Qty: 270 TABLET | Refills: 3 | Status: SHIPPED | OUTPATIENT
Start: 2023-10-30

## 2024-05-16 RX ORDER — APIXABAN 5 MG/1
TABLET, FILM COATED ORAL
Qty: 180 TABLET | Refills: 3 | Status: SHIPPED | OUTPATIENT
Start: 2024-05-16

## 2024-06-10 ENCOUNTER — OFFICE VISIT (OUTPATIENT)
Dept: CARDIOLOGY CLINIC | Age: 84
End: 2024-06-10
Payer: COMMERCIAL

## 2024-06-10 VITALS
SYSTOLIC BLOOD PRESSURE: 110 MMHG | HEIGHT: 64 IN | DIASTOLIC BLOOD PRESSURE: 70 MMHG | HEART RATE: 72 BPM | WEIGHT: 172 LBS | BODY MASS INDEX: 29.37 KG/M2

## 2024-06-10 DIAGNOSIS — I48.0 PAROXYSMAL ATRIAL FIBRILLATION (HCC): Primary | ICD-10-CM

## 2024-06-10 PROCEDURE — 3074F SYST BP LT 130 MM HG: CPT | Performed by: INTERNAL MEDICINE

## 2024-06-10 PROCEDURE — 1123F ACP DISCUSS/DSCN MKR DOCD: CPT | Performed by: INTERNAL MEDICINE

## 2024-06-10 PROCEDURE — 99214 OFFICE O/P EST MOD 30 MIN: CPT | Performed by: INTERNAL MEDICINE

## 2024-06-10 PROCEDURE — 93000 ELECTROCARDIOGRAM COMPLETE: CPT | Performed by: INTERNAL MEDICINE

## 2024-06-10 PROCEDURE — 3078F DIAST BP <80 MM HG: CPT | Performed by: INTERNAL MEDICINE

## 2024-06-10 ASSESSMENT — ENCOUNTER SYMPTOMS
FACIAL SWELLING: 0
WHEEZING: 0
NAUSEA: 0
ABDOMINAL DISTENTION: 0
BACK PAIN: 1
SHORTNESS OF BREATH: 0
EYE DISCHARGE: 0
EYE PAIN: 0
ABDOMINAL PAIN: 0
APNEA: 0
COUGH: 0
VOMITING: 0
DIARRHEA: 0
BLOOD IN STOOL: 0
EYE REDNESS: 0
SORE THROAT: 0
CONSTIPATION: 0
CHEST TIGHTNESS: 0

## 2024-06-10 NOTE — PROGRESS NOTES
Cardiology Office Visit Note  [address] [address]  Phone: [phone]  Fax: [phone]                            Date:  6/10/2024  Patient: Jonny Wu  Age:  83 y.o., 1940    Referral: No ref. provider found    REASON FOR VISIT:  No chief complaint on file.         PROBLEM LIST:    Patient Active Problem List    Diagnosis Date Noted    Hematuria 03/13/2018    Weakness 09/12/2017    Chronic anticoagulation 09/12/2017     Overview Note:     Eliquis mg twice daily      Bradycardia 05/07/2013    Fatigue 05/07/2013    Hypertension     Palpitation     Paroxysmal atrial fibrillation (HCC)     Mixed hyperlipidemia          PRESENTATION: Jonny Wu is a 83 y.o. year old female is seen in cardiology consultation for further evaluation and management of chronic essential hypertension, paroxysmal atrial fibrillation on chronic anticoagulation therapy and chronic mixed dyslipidemia.  Overall patient has been stable from a cardiovascular standpoint without complaints of chest pain or shortness of breath.  She did have an episode of vertigo with elevated blood pressure and nosebleed status post cautery several months ago for which she sought care in the emergency room.  She is compliant with her medications which include propafenone.  No palpitations or syncope reported.    REVIEW OF SYSTEMS:  Review of Systems   Constitutional:  Negative for chills, fatigue and fever.   HENT:  Negative for congestion, facial swelling, hearing loss and sore throat.    Eyes:  Negative for pain, discharge, redness and visual disturbance.   Respiratory:  Negative for apnea, cough, chest tightness, shortness of breath and wheezing.    Cardiovascular:  Negative for chest pain, palpitations and leg swelling.   Gastrointestinal:  Negative for abdominal distention, abdominal pain, blood in stool, constipation, diarrhea, nausea and vomiting.   Endocrine: Negative for polydipsia, polyphagia and polyuria.

## 2024-08-20 RX ORDER — METOPROLOL SUCCINATE 25 MG/1
TABLET, EXTENDED RELEASE ORAL
Qty: 45 TABLET | Refills: 1 | Status: SHIPPED | OUTPATIENT
Start: 2024-08-20

## 2024-10-21 RX ORDER — PROPAFENONE HYDROCHLORIDE 225 MG/1
TABLET, FILM COATED ORAL
Qty: 270 TABLET | Refills: 3 | Status: SHIPPED | OUTPATIENT
Start: 2024-10-21

## 2025-02-17 RX ORDER — METOPROLOL SUCCINATE 25 MG/1
TABLET, EXTENDED RELEASE ORAL
Qty: 45 TABLET | Refills: 1 | Status: SHIPPED | OUTPATIENT
Start: 2025-02-17